# Patient Record
Sex: MALE | Race: WHITE | Employment: FULL TIME | URBAN - METROPOLITAN AREA
[De-identification: names, ages, dates, MRNs, and addresses within clinical notes are randomized per-mention and may not be internally consistent; named-entity substitution may affect disease eponyms.]

---

## 2017-04-11 ENCOUNTER — HOSPITAL ENCOUNTER (INPATIENT)
Age: 42
LOS: 7 days | Discharge: HOME | DRG: 057 | End: 2017-04-18
Attending: INTERNAL MEDICINE | Admitting: INTERNAL MEDICINE
Payer: COMMERCIAL

## 2017-04-11 DIAGNOSIS — E55.9 VITAMIN D INSUFFICIENCY: Chronic | ICD-10-CM

## 2017-04-11 DIAGNOSIS — I63.9 ACUTE ISCHEMIC STROKE (HCC): Primary | ICD-10-CM

## 2017-04-11 DIAGNOSIS — E11.69 DYSLIPIDEMIA WITH LOW HIGH DENSITY LIPOPROTEIN (HDL) CHOLESTEROL WITH HYPERTRIGLYCERIDEMIA DUE TO TYPE 2 DIABETES MELLITUS (HCC): Chronic | ICD-10-CM

## 2017-04-11 DIAGNOSIS — E78.2 DYSLIPIDEMIA WITH LOW HIGH DENSITY LIPOPROTEIN (HDL) CHOLESTEROL WITH HYPERTRIGLYCERIDEMIA DUE TO TYPE 2 DIABETES MELLITUS (HCC): Chronic | ICD-10-CM

## 2017-04-11 DIAGNOSIS — N18.2 TYPE 2 DIABETES MELLITUS WITH STAGE 2 CHRONIC KIDNEY DISEASE, WITHOUT LONG-TERM CURRENT USE OF INSULIN (HCC): ICD-10-CM

## 2017-04-11 DIAGNOSIS — E11.22 TYPE 2 DIABETES MELLITUS WITH STAGE 2 CHRONIC KIDNEY DISEASE, WITHOUT LONG-TERM CURRENT USE OF INSULIN (HCC): ICD-10-CM

## 2017-04-11 DIAGNOSIS — I11.9 HYPERTENSIVE HEART DISEASE WITHOUT HEART FAILURE: Chronic | ICD-10-CM

## 2017-04-11 PROBLEM — Z78.9 IMPAIRED MOBILITY AND ADLS: Status: ACTIVE | Noted: 2017-04-06

## 2017-04-11 PROBLEM — Z74.09 IMPAIRED MOBILITY AND ADLS: Status: ACTIVE | Noted: 2017-04-06

## 2017-04-11 PROBLEM — E78.5 DYSLIPIDEMIA: Chronic | Status: ACTIVE | Noted: 2017-04-06

## 2017-04-11 PROBLEM — E11.9 NEW ONSET TYPE 2 DIABETES MELLITUS (HCC): Chronic | Status: ACTIVE | Noted: 2017-04-06

## 2017-04-11 PROBLEM — I69.354 HEMIPARESIS AFFECTING LEFT SIDE AS LATE EFFECT OF CEREBROVASCULAR ACCIDENT (CVA) (HCC): Status: ACTIVE | Noted: 2017-04-06

## 2017-04-11 LAB — GLUCOSE BLD STRIP.AUTO-MCNC: 171 MG/DL (ref 70–110)

## 2017-04-11 PROCEDURE — 65310000000 HC RM PRIVATE REHAB

## 2017-04-11 PROCEDURE — 74011250636 HC RX REV CODE- 250/636: Performed by: INTERNAL MEDICINE

## 2017-04-11 PROCEDURE — 74011250637 HC RX REV CODE- 250/637: Performed by: INTERNAL MEDICINE

## 2017-04-11 PROCEDURE — 74011636637 HC RX REV CODE- 636/637: Performed by: INTERNAL MEDICINE

## 2017-04-11 PROCEDURE — 82962 GLUCOSE BLOOD TEST: CPT

## 2017-04-11 RX ORDER — LISINOPRIL 20 MG/1
20 TABLET ORAL DAILY
Status: DISCONTINUED | OUTPATIENT
Start: 2017-04-12 | End: 2017-04-12

## 2017-04-11 RX ORDER — BISACODYL 5 MG
10 TABLET, DELAYED RELEASE (ENTERIC COATED) ORAL
Status: DISCONTINUED | OUTPATIENT
Start: 2017-04-11 | End: 2017-04-18 | Stop reason: HOSPADM

## 2017-04-11 RX ORDER — ACETAMINOPHEN 325 MG/1
650 TABLET ORAL
Status: DISCONTINUED | OUTPATIENT
Start: 2017-04-11 | End: 2017-04-18 | Stop reason: HOSPADM

## 2017-04-11 RX ORDER — LISINOPRIL AND HYDROCHLOROTHIAZIDE 20; 25 MG/1; MG/1
1 TABLET ORAL DAILY
COMMUNITY
End: 2017-04-18

## 2017-04-11 RX ORDER — AMLODIPINE BESYLATE 5 MG/1
10 TABLET ORAL DAILY
Status: DISCONTINUED | OUTPATIENT
Start: 2017-04-12 | End: 2017-04-18 | Stop reason: HOSPADM

## 2017-04-11 RX ORDER — INSULIN LISPRO 100 [IU]/ML
INJECTION, SOLUTION INTRAVENOUS; SUBCUTANEOUS
Status: DISCONTINUED | OUTPATIENT
Start: 2017-04-12 | End: 2017-04-18 | Stop reason: HOSPADM

## 2017-04-11 RX ORDER — ATORVASTATIN CALCIUM 40 MG/1
40 TABLET, FILM COATED ORAL
Status: DISCONTINUED | OUTPATIENT
Start: 2017-04-11 | End: 2017-04-18 | Stop reason: HOSPADM

## 2017-04-11 RX ORDER — HEPARIN SODIUM 5000 [USP'U]/ML
5000 INJECTION, SOLUTION INTRAVENOUS; SUBCUTANEOUS EVERY 8 HOURS
Status: DISCONTINUED | OUTPATIENT
Start: 2017-04-11 | End: 2017-04-18 | Stop reason: HOSPADM

## 2017-04-11 RX ORDER — AMLODIPINE BESYLATE 5 MG/1
5 TABLET ORAL DAILY
Status: ON HOLD | COMMUNITY
End: 2017-04-17

## 2017-04-11 RX ORDER — ASPIRIN 325 MG
325 TABLET ORAL
Status: DISCONTINUED | OUTPATIENT
Start: 2017-04-12 | End: 2017-04-18 | Stop reason: HOSPADM

## 2017-04-11 RX ORDER — DOCUSATE SODIUM 100 MG/1
100 CAPSULE, LIQUID FILLED ORAL 2 TIMES DAILY
Status: DISCONTINUED | OUTPATIENT
Start: 2017-04-11 | End: 2017-04-12

## 2017-04-11 RX ORDER — METFORMIN HYDROCHLORIDE 500 MG/1
500 TABLET ORAL 2 TIMES DAILY WITH MEALS
Status: DISCONTINUED | OUTPATIENT
Start: 2017-04-12 | End: 2017-04-18 | Stop reason: HOSPADM

## 2017-04-11 RX ADMIN — HEPARIN SODIUM 5000 UNITS: 5000 INJECTION, SOLUTION INTRAVENOUS; SUBCUTANEOUS at 21:30

## 2017-04-11 RX ADMIN — INSULIN DETEMIR 10 UNITS: 100 INJECTION, SOLUTION SUBCUTANEOUS at 22:44

## 2017-04-11 RX ADMIN — DOCUSATE SODIUM 100 MG: 100 CAPSULE, LIQUID FILLED ORAL at 21:30

## 2017-04-11 RX ADMIN — ATORVASTATIN CALCIUM 40 MG: 40 TABLET, FILM COATED ORAL at 21:30

## 2017-04-11 NOTE — IP AVS SNAPSHOT
Summary of Care Report The Summary of Care report has been created to help improve care coordination. Users with access to hereO or [facility] (Web-based application) may access additional patient information including the Discharge Summary. If you are not currently a [address] user and need more information, please call the number listed below in the [place] [address] section and ask to be connected with Medical Records. Facility Information Name Address Phone [address] [place] [address]  [address] [facility] [address] [phone] Patient Information Patient Name Sex AICHA Sharp ([id number]) Male 1975 Referral Details Referred By Referred To  
 Mukesh Erwin MD  
[address] [address] Phone:  [phone] Fax:  [phone] Order:  Referral To Physical Therapy Reason:  Specialty Services Required Comment:  Evaluate and treat (three times a week for 4 weeks). Diagnosis:   
 Acute Ischemic Stroke (acute right basal ganglia region infarct) with residual left hemiparesis and mild cognitive-linguistic deficits * After 4 weeks of physical therapy, kindly send recertification request to PCP (Dr. Soha Bella. Leda Burkett) Mukesh Volly, [address] [address] Phone:  [phone] Fax:  [phone] Order:  Referral To Occupational Therapy Reason:  Specialty Services Required Comment:  Evaluate and treat (three times a week for 4 weeks). Diagnosis:   
 Acute Ischemic Stroke (acute right basal ganglia region infarct) with residual left hemiparesis and mild cognitive-linguistic deficits * After 4 weeks of occupational therapy, kindly send recertification request to PCP (Dr. Soha Bella. Leda Burkett) Mukesh [address] [address] Phone:  [phone] Fax:  [phone] Order:  Referral To Speech Therapy Reason:  Specialty Services Required Comment:  Evaluate and treat (three times a week for 4 weeks). Diagnosis:   
 Acute Ischemic Stroke (acute right basal ganglia region infarct) with residual left hemiparesis and mild cognitive-linguistic deficits * After 4 weeks of speech therapy, kindly send recertification request to PCP (Dr. Jhonatan Heaton. Edwige Voss) Discharge Information Admitting Provider Service Area Unit Pepito Kendall MD / [address] Unit / [phone] Discharge Provider Discharge Date/Time Discharge Disposition Destination (none) 4/18/2017 Morning (Pending) AHR (none) Patient Language Language ENGLISH [13] Hospital Problems as of 4/18/2017  Reviewed: 4/18/2017  9:52 AM by Pepito Kendall MD  
  
  
  
 Class Noted - Resolved Last Modified POA Active Problems Hypertensive heart disease without heart failure (Chronic)  Unknown - Present 4/11/2017 by Pepito Kendall MD Yes Entered by Pepito Kendall MD  
  * (Principal)Acute ischemic stroke [facility])  4/6/2017 - Present 4/12/2017 by Pepito Kendall MD Yes Entered by Pepito Kendall MD  
  Overview Addendum 4/12/2017  5:37 PM by Pepito Kendall MD  
   Acute Ischemic Stroke (acute right basal ganglia region infarct) with residual left hemiparesis and mild cognitive-linguistic deficits Impaired mobility and ADLs  4/6/2017 - Present 4/11/2017 by Pepito Kendall MD Yes Entered by Pepito Kendall MD  
  Hemiparesis affecting left side as late effect of cerebrovascular accident (CVA) ([facility] [address]  4/6/2017 - Present 4/11/2017 by Pepito Kendall MD Yes   Entered by Pepito Kendall MD  
  Dyslipidemia with low high density lipoprotein (HDL) cholesterol with hypertriglyceridemia due to type 2 diabetes mellitus (HCC) (Chronic) 4/6/2017 - Present 4/12/2017 by Percy Alexander MD Yes Entered by Percy Alexander MD  
  Overview Signed 4/12/2017 10:10 AM by Percy Alexander MD  
   Lipid profile (4/12/2017): , , HDL 29, LDL 55 Vitamin D insufficiency (Chronic)  4/12/2017 - Present 4/12/2017 by Percy Alexander MD Yes Entered by Percy Alexander MD  
  Overview Signed 4/12/2017 10:03 AM by Percy Alexander MD  
   Vitamin D 25-Hydroxy (4/12/2017) = 22.6 Chronic kidney disease (CKD) stage G2/A1, mildly decreased glomerular filtration rate (GFR) between 60-89 mL/min/1.73 square meter and albuminuria creatinine ratio less than 30 mg/g (Chronic)  4/12/2017 - Present 4/13/2017 by Percy Alexander MD Yes Entered by Percy Alexander MD  
  Type 2 diabetes mellitus with stage 2 chronic kidney disease ([facility] [address]  4/6/2017 - Present 4/13/2017 by Percy Alexander MD Yes Entered by Percy Alexander MD  
  Overview Signed 4/13/2017  1:51 PM by Percy Alexander MD  
   HbA1c (4/12/2017) = 10.5 Non-Hospital Problems as of 4/18/2017  Reviewed: 4/18/2017  9:52 AM by Percy Alexander MD  
  
  
  
 Class Noted - Resolved Last Modified Active Problems Diastolic dysfunction without heart failure (Chronic)  Unknown - Present 4/11/2017 by Percy Alexander MD  
  Entered by Percy Alexander MD  
  Obesity, Class I, BMI 30-34.9 (Chronic)  Unknown - Present 4/11/2017 by Percy Alexander MD  
  Entered by Percy Alexander MD  
  
You are allergic to the following No active allergies Current Discharge Medication List  
  
START taking these medications Dose & Instructions Dispensing Information Comments  
 aspirin 325 mg tablet Commonly known as:  ASPIRIN Dose:  325 mg Take 1 Tab by mouth daily (with breakfast). Indications: acute thromboembolic stroke Quantity:  30 Tab Refills:  0  
   
 atorvastatin 40 mg tablet Commonly known as:  LIPITOR  Dose:  40 mg  
 Take 1 Tab by mouth nightly. Indications: DYSLIPIDEMIA Quantity:  30 Tab Refills:  0  
   
 cholecalciferol 1,000 unit tablet Commonly known as:  VITAMIN D3 Dose:  2000 Units Take 2 Tabs by mouth daily. Indications: VITAMIN D INSUFFICIENCY Quantity:  60 Tab Refills:  0  
   
 glipiZIDE 5 mg tablet Commonly known as:  Corean Shingles Dose:  5 mg Take 1 Tab by mouth Daily (before breakfast). Indications: type 2 diabetes mellitus Quantity:  30 Tab Refills:  0  
   
 labetalol 100 mg tablet Commonly known as:  Arie Galley Dose:  100 mg Take 1 Tab by mouth every twelve (12) hours. Indications: hypertension Quantity:  60 Tab Refills:  0  
   
 lisinopril 40 mg tablet Commonly known as:  Anand Bold Dose:  40 mg Take 1 Tab by mouth daily. Indications: hypertension Quantity:  30 Tab Refills:  0  
   
 metFORMIN 500 mg tablet Commonly known as:  GLUCOPHAGE Dose:  500 mg Take 1 Tab by mouth two (2) times daily (with meals). Indications: type 2 diabetes mellitus Quantity:  60 Tab Refills:  0  
   
 omega-3 acid ethyl esters 1 gram capsule Commonly known as:  Orie Senate Dose:  1 g Take 1 Cap by mouth two (2) times daily (with meals). Indications: HYPERTRIGLYCERIDEMIA Quantity:  60 Cap Refills:  0 CONTINUE these medications which have CHANGED Dose & Instructions Dispensing Information Comments  
 amLODIPine 10 mg tablet Commonly known as:  Masha Credit What changed:   
- medication strength 
- how much to take Dose:  10 mg Take 1 Tab by mouth daily. Indications: hypertension Quantity:  30 Tab Refills:  0 STOP taking these medications Comments  
 lisinopril-hydroCHLOROthiazide 20-25 mg per tablet Commonly known as:  America Price Follow-up Information Follow up With Details Comments Contact Info  Inocencia Zarate MD On 5/1/2017 Patient has an appointment scheduled with PCP Dr. Portia Pearson on May 1, 2017 @ 9:45am. [place] 
[address] [facility] 
[phone] Hayley Varner MD On 6/9/2017 Patient has an appointment scheduled with Neurology Dr. Bhargavi Carlton on June 9, 2017 @ 11:00am. [address] Burden [address] [facility] 
[phone] Discharge Instructions DISCHARGE SUMMARY from Nurse The following personal items are in your possession at time of discharge: 
 
Dental Appliances: None Visual Aid: At bedside, Glasses Home Medications: None Jewelry: None Clothing: None Other Valuables: Cell Phone Personal Items Sent to Safe: none PATIENT INSTRUCTIONS: 
 
 
F-face looks uneven A-arms unable to move or move unevenly S-speech slurred or non-existent T-time-call 911 as soon as signs and symptoms begin-DO NOT go Back to bed or wait to see if you get better-TIME IS BRAIN. Warning Signs of HEART ATTACK Call 911 if you have these symptoms: 
? Chest discomfort. Most heart attacks involve discomfort in the center of the chest that lasts more than a few minutes, or that goes away and comes back. It can feel like uncomfortable pressure, squeezing, fullness, or pain. ? Discomfort in other areas of the upper body. Symptoms can include pain or discomfort in one or both arms, the back, neck, jaw, or stomach. ? Shortness of breath with or without chest discomfort. ? Other signs may include breaking out in a cold sweat, nausea, or lightheadedness. Don't wait more than five minutes to call [address]! Fast action can save your life.  Calling 911 is almost always the fastest way to get lifesaving treatment. Emergency Medical Services staff can begin treatment when they arrive  up to an hour sooner than if someone gets to the hospital by car. The discharge information has been reviewed with the patient. The patient verbalized understanding. Discharge medications reviewed with the patient and appropriate educational materials and side effects teaching were provided. Patient armband removed and shredded MyChart Activation Thank you for requesting access to [employer]. Please follow the instructions below to securely access and download your online medical record. [employer] allows you to send messages to your doctor, view your test results, renew your prescriptions, schedule appointments, and more. How Do I Sign Up? 1. In your internet browser, go to [email or web address] 
2. Click on the First Time User? Click Here link in the Sign In box. You will be redirect to the New Member Sign Up page. 3. Enter your [employer] Access Code exactly as it appears below. You will not need to use this code after youve completed the sign-up process. If you do not sign up before the expiration date, you must request a new code. [employer] Access Code: [email or web address] Expires: 7/10/2017  8:26 PM (This is the date your [employer] access code will ) 4. Enter the last four digits of your Social Security Number (xxxx) and Date of Birth (mm/dd/yyyy) as indicated and click Submit. You will be taken to the next sign-up page. 5. Create a [employer] ID. This will be your [employer] login ID and cannot be changed, so think of one that is secure and easy to remember. 6. Create a [employer] password. You can change your password at any time. 7. Enter your Password Reset Question and Answer. This can be used at a later time if you forget your password. 8. Enter your e-mail address. You will receive e-mail notification when new information is available in [address] 9. Click Sign Up. You can now view and download portions of your medical record. 10. Click the Download Summary menu link to download a portable copy of your medical information. Additional Information If you have questions, please visit the Frequently Asked Questions section of the [employer] website at [email or web address] Remember, MyChart is NOT to be used for urgent needs. For medical emergencies, dial 911. 
 
 
 
 
 
 
 
 
------------------------------------------------------------------------------------------------------------ 
 
DISCHARGE INSTRUCTIONS 1. Make sure that when you request refills at the pharmacy that the refill requests are sent to your PCP (NOT to the prescriber at the [facility]) to avoid any delays in getting your medication refills. The physician at the [facility] for [address] [facility] will not able to order medications or refills after discharge -- Please understand that though we would like to help, it is simply not safe for our physician to order you a medication that we cannot monitor. 2. If any of the prescribed medications require a prior authorization, contact your Primary Care Physician or specialist to EITHER complete prior authorizations and paperwork on your behalf OR prescribe an alternative medication. -- Please understand that though we would like to help, it is simply not safe for our physician to order you a medication that we cannot monitor. 3. Over-the-counter (OTC) medications will NOT be prescribed. You need to buy these medications over-the-counter. 4. If you will be having outpatient therapy, make sure you have the paper prescription for your outpatient therapy when you leave the hospital. Also make sure that you bring this paper prescription to the outpatient gym you are going to get your outpatient therapy. ------------------------------------------------------------------------------------------------------------------- Chart Review Routing History No Routing History on File

## 2017-04-11 NOTE — IP AVS SNAPSHOT
Current Discharge Medication List  
  
START taking these medications Dose & Instructions Dispensing Information Comments Morning Noon Evening Bedtime  
 aspirin 325 mg tablet Commonly known as:  ASPIRIN Your last dose was: Your next dose is:    
   
   
 Dose:  325 mg Take 1 Tab by mouth daily (with breakfast). Indications: acute thromboembolic stroke Quantity:  30 Tab Refills:  0  
     
   
   
   
  
 atorvastatin 40 mg tablet Commonly known as:  LIPITOR Your last dose was: Your next dose is:    
   
   
 Dose:  40 mg Take 1 Tab by mouth nightly. Indications: DYSLIPIDEMIA Quantity:  30 Tab Refills:  0  
     
   
   
   
  
 cholecalciferol 1,000 unit tablet Commonly known as:  VITAMIN D3 Your last dose was: Your next dose is:    
   
   
 Dose:  2000 Units Take 2 Tabs by mouth daily. Indications: VITAMIN D INSUFFICIENCY Quantity:  60 Tab Refills:  0  
     
   
   
   
  
 glipiZIDE 5 mg tablet Commonly known as:  Artist Doll Your last dose was: Your next dose is:    
   
   
 Dose:  5 mg Take 1 Tab by mouth Daily (before breakfast). Indications: type 2 diabetes mellitus Quantity:  30 Tab Refills:  0  
     
   
   
   
  
 labetalol 100 mg tablet Commonly known as:  Debroah Lente Your last dose was: Your next dose is:    
   
   
 Dose:  100 mg Take 1 Tab by mouth every twelve (12) hours. Indications: hypertension Quantity:  60 Tab Refills:  0  
     
   
   
   
  
 lisinopril 40 mg tablet Commonly known as:  Walker Leonidas Your last dose was: Your next dose is:    
   
   
 Dose:  40 mg Take 1 Tab by mouth daily. Indications: hypertension Quantity:  30 Tab Refills:  0  
     
   
   
   
  
 metFORMIN 500 mg tablet Commonly known as:  GLUCOPHAGE Your last dose was:     
   
Your next dose is:    
   
   
 Dose:  500 mg  
 Take 1 Tab by mouth two (2) times daily (with meals). Indications: type 2 diabetes mellitus Quantity:  60 Tab Refills:  0  
     
   
   
   
  
 omega-3 acid ethyl esters 1 gram capsule Commonly known as:  Yvette Mahoney Your last dose was: Your next dose is:    
   
   
 Dose:  1 g Take 1 Cap by mouth two (2) times daily (with meals). Indications: HYPERTRIGLYCERIDEMIA Quantity:  60 Cap Refills:  0 CONTINUE these medications which have CHANGED Dose & Instructions Dispensing Information Comments Morning Noon Evening Bedtime  
 amLODIPine 10 mg tablet Commonly known as:  Paris Heath What changed:   
- medication strength 
- how much to take Your last dose was: Your next dose is:    
   
   
 Dose:  10 mg Take 1 Tab by mouth daily. Indications: hypertension Quantity:  30 Tab Refills:  0 STOP taking these medications   
 lisinopril-hydroCHLOROthiazide 20-25 mg per tablet Commonly known as:  Unruly Sheppard Where to Get Your Medications These medications were sent to [facility], [address] [place] [address], [address] [place] [id number] Phone:  [phone]  
  amLODIPine 10 mg tablet  
 aspirin 325 mg tablet  
 atorvastatin 40 mg tablet  
 cholecalciferol 1,000 unit tablet  
 glipiZIDE 5 mg tablet  
 labetalol 100 mg tablet  
 lisinopril 40 mg tablet  
 metFORMIN 500 mg tablet  
 omega-3 acid ethyl esters 1 gram capsule

## 2017-04-11 NOTE — IP AVS SNAPSHOT
[address] Patient: Shani Adair MRN: [id number] You are allergic to the following No active allergies Recent Documentation Height Weight BMI  
  
  
 1.676 m 91.1 kg 32.42 kg/m2 Emergency Contacts Name Discharge Info Relation Home Work Mobile Jaiden Ashton DECLINED CAREGIVER [4] Mother [14] [phone] Delmis Ackerman DECLINED CAREGIVER [4] Friend [5] [phone] About your hospitalization You were admitted on:  April 11, 2017 You last received care in the:  [facility] IP REHAB UNIT You were discharged on:  April 18, 2017 Unit phone number:  [phone] Why you were hospitalized Your primary diagnosis was:  Acute Ischemic Stroke (Hcc) Your diagnoses also included:  Hypertensive Heart Disease Without Heart Failure, Impaired Mobility And Adls, Hemiparesis Affecting Left Side As Late Effect Of Cerebrovascular Accident (Cva) (Hcc), Vitamin D Insufficiency, Dyslipidemia With Low High Density Lipoprotein (Hdl) Cholesterol With Hypertriglyceridemia Due To Type 2 Diabetes Mellitus (Hcc), Chronic Kidney Disease (Ckd) Stage G2/A1, Mildly Decreased Glomerular Filtration Rate (Gfr) Between 60-89 Ml/Min/1.73 Square Meter And Albuminuria Creatinine Ratio Less Than 30 Mg/G, Type 2 Diabetes Mellitus With Stage 2 Chronic Kidney Disease (Hcc) Providers Seen During Your Hospitalizations Provider Role Specialty Primary office phone Lior Boateng MD Attending Provider Internal Medicine [phone] Your Primary Care Physician (PCP) Primary Care Physician Office Phone Office Fax Ole Hester [address], [address] [phone] Follow-up Information Follow up With Details Comments Contact Info  Emmy Early MD On 5/1/2017 Patient has an appointment scheduled with PCP Dr. Joanne Cullen on May 1, 2017 @ 9:45am. [place] 
[address] 200 [facility] 
[phone] Jessenia Baldwin MD On 6/9/2017 Patient has an appointment scheduled with Neurology Dr. Alejandro Summers on June 9, 2017 @ 11:00am. [address] 200 [facility] 
[phone] Current Discharge Medication List  
  
START taking these medications Dose & Instructions Dispensing Information Comments Morning Noon Evening Bedtime  
 aspirin 325 mg tablet Commonly known as:  ASPIRIN Your last dose was: Your next dose is:    
   
   
 Dose:  325 mg Take 1 Tab by mouth daily (with breakfast). Indications: acute thromboembolic stroke Quantity:  30 Tab Refills:  0  
     
   
   
   
  
 atorvastatin 40 mg tablet Commonly known as:  LIPITOR Your last dose was: Your next dose is:    
   
   
 Dose:  40 mg Take 1 Tab by mouth nightly. Indications: DYSLIPIDEMIA Quantity:  30 Tab Refills:  0  
     
   
   
   
  
 cholecalciferol 1,000 unit tablet Commonly known as:  VITAMIN D3 Your last dose was: Your next dose is:    
   
   
 Dose:  2000 Units Take 2 Tabs by mouth daily. Indications: VITAMIN D INSUFFICIENCY Quantity:  60 Tab Refills:  0  
     
   
   
   
  
 glipiZIDE 5 mg tablet Commonly known as:  Bereket Sauger Your last dose was: Your next dose is:    
   
   
 Dose:  5 mg Take 1 Tab by mouth Daily (before breakfast). Indications: type 2 diabetes mellitus Quantity:  30 Tab Refills:  0  
     
   
   
   
  
 labetalol 100 mg tablet Commonly known as:  Lubertha Malloy Your last dose was: Your next dose is:    
   
   
 Dose:  100 mg Take 1 Tab by mouth every twelve (12) hours. Indications: hypertension Quantity:  60 Tab Refills:  0  
     
   
   
   
  
 lisinopril 40 mg tablet Commonly known as:  Masha Cozier Your last dose was: Your next dose is:    
   
   
 Dose:  40 mg Take 1 Tab by mouth daily. Indications: hypertension Quantity:  30 Tab Refills:  0  
     
   
   
   
  
 metFORMIN 500 mg tablet Commonly known as:  GLUCOPHAGE Your last dose was: Your next dose is:    
   
   
 Dose:  500 mg Take 1 Tab by mouth two (2) times daily (with meals). Indications: type 2 diabetes mellitus Quantity:  60 Tab Refills:  0  
     
   
   
   
  
 omega-3 acid ethyl esters 1 gram capsule Commonly known as:  Mary Bina Your last dose was: Your next dose is:    
   
   
 Dose:  1 g Take 1 Cap by mouth two (2) times daily (with meals). Indications: HYPERTRIGLYCERIDEMIA Quantity:  60 Cap Refills:  0 CONTINUE these medications which have CHANGED Dose & Instructions Dispensing Information Comments Morning Noon Evening Bedtime  
 amLODIPine 10 mg tablet Commonly known as:  Ji Sosa What changed:   
- medication strength 
- how much to take Your last dose was: Your next dose is:    
   
   
 Dose:  10 mg Take 1 Tab by mouth daily. Indications: hypertension Quantity:  30 Tab Refills:  0 STOP taking these medications   
 lisinopril-hydroCHLOROthiazide 20-25 mg per tablet Commonly known as:  Saulo Nevarez Where to Get Your Medications These medications were sent to [facility], [address] [place] [address], [address] [place] [address] Phone:  [phone]  
  amLODIPine 10 mg tablet  
 aspirin 325 mg tablet  
 atorvastatin 40 mg tablet  
 cholecalciferol 1,000 unit tablet  
 glipiZIDE 5 mg tablet  
 labetalol 100 mg tablet  
 lisinopril 40 mg tablet  
 metFORMIN 500 mg tablet  
 omega-3 acid ethyl esters 1 gram capsule Discharge Instructions DISCHARGE SUMMARY from Nurse The following personal items are in your possession at time of discharge: Dental Appliances: None Visual Aid: At bedside, Glasses Home Medications: None Jewelry: None Clothing: None Other Valuables: Cell Phone Personal Items Sent to Safe: none PATIENT INSTRUCTIONS: 
 
 
F-face looks uneven A-arms unable to move or move unevenly S-speech slurred or non-existent T-time-call 911 as soon as signs and symptoms begin-DO NOT go Back to bed or wait to see if you get better-TIME IS BRAIN. Warning Signs of HEART ATTACK Call 911 if you have these symptoms: 
? Chest discomfort. Most heart attacks involve discomfort in the center of the chest that lasts more than a few minutes, or that goes away and comes back. It can feel like uncomfortable pressure, squeezing, fullness, or pain. ? Discomfort in other areas of the upper body. Symptoms can include pain or discomfort in one or both arms, the back, neck, jaw, or stomach. ? Shortness of breath with or without chest discomfort. ? Other signs may include breaking out in a cold sweat, nausea, or lightheadedness. Don't wait more than five minutes to call [address]! Fast action can save your life. Calling 911 is almost always the fastest way to get lifesaving treatment. Emergency Medical Services staff can begin treatment when they arrive  up to an hour sooner than if someone gets to the hospital by car. The discharge information has been reviewed with the patient. The patient verbalized understanding. Discharge medications reviewed with the patient and appropriate educational materials and side effects teaching were provided. Patient armband removed and shredded MyChart Activation Thank you for requesting access to [employer]d. Please follow the instructions below to securely access and download your online medical record. [employer]d allows you to send messages to your doctor, view your test results, renew your prescriptions, schedule appointments, and more. How Do I Sign Up? 1. In your internet browser, go to [email or web address] 
2. Click on the First Time User? Click Here link in the Sign In box. You will be redirect to the New Member Sign Up page. 3. Enter your [employer]d Access Code exactly as it appears below. You will not need to use this code after youve completed the sign-up process. If you do not sign up before the expiration date, you must request a new code. [facility]C[employer]d Access Code: [email or web address] Expires: 7/10/2017  8:26 PM (This is the date your [employer]d access code will ) 4. Enter the last four digits of your Social Security Number (xxxx) and Date of Birth (mm/dd/yyyy) as indicated and click Submit. You will be taken to the next sign-up page. 5. Create a ieC[employer]d ID. This will be your [facility]C[employer]d login ID and cannot be changed, so think of one that is secure and easy to remember. 6. Create a [employer]d password. You can change your password at any time. 7. Enter your Password Reset Question and Answer. This can be used at a later time if you forget your password. 8. Enter your e-mail address. You will receive e-mail notification when new information is available in [address] 9. Click Sign Up. You can now view and download portions of your medical record. 10. Click the Download Summary menu link to download a portable copy of your medical information. Additional Information If you have questions, please visit the Frequently Asked Questions section of the [employer]Crowd website at [email or web address] Remember, ieCrowd is NOT to be used for urgent needs. For medical emergencies, dial 911. ------------------------------------------------------------------------------------------------------------ 
 
DISCHARGE INSTRUCTIONS 1. Make sure that when you request refills at the pharmacy that the refill requests are sent to your PCP (NOT to the prescriber at the [facility]) to avoid any delays in getting your medication refills. The physician at the [facility] for [facility] [address] will not able to order medications or refills after discharge -- Please understand that though we would like to help, it is simply not safe for our physician to order you a medication that we cannot monitor. 2. If any of the prescribed medications require a prior authorization, contact your Primary Care Physician or specialist to EITHER complete prior authorizations and paperwork on your behalf OR prescribe an alternative medication. -- Please understand that though we would like to help, it is simply not safe for our physician to order you a medication that we cannot monitor. 3. Over-the-counter (OTC) medications will NOT be prescribed. You need to buy these medications over-the-counter. 4. If you will be having outpatient therapy, make sure you have the paper prescription for your outpatient therapy when you leave the hospital. Also make sure that you bring this paper prescription to the outpatient gym you are going to get your outpatient therapy. 
 
------------------------------------------------------------------------------------------------------------------- Discharge Orders Procedure Order Date Status Priority Quantity Spec Type Associated Dx REFERRAL TO PHYSICAL THERAPY 04/14/17 1330 Normal Routine 1 Comments:  Evaluate and treat (three times a week for 4 weeks). Diagnosis:  
Acute Ischemic Stroke (acute right basal ganglia region infarct) with residual left hemiparesis and mild cognitive-linguistic deficits * After 4 weeks of physical therapy, kindly send recertification request to PCP (Dr. Jinny Saul. Emy Serna) REFERRAL TO OCCUPATIONAL THERAPY 04/14/17 1330 Normal Routine 1 Comments:  Evaluate and treat (three times a week for 4 weeks). Diagnosis:  
Acute Ischemic Stroke (acute right basal ganglia region infarct) with residual left hemiparesis and mild cognitive-linguistic deficits * After 4 weeks of occupational therapy, kindly send recertification request to PCP (Dr. Jinny Saul. Emy Serna) REFERRAL TO SPEECH THERAPY 04/14/17 1330 Normal Routine 1 Comments:  Evaluate and treat (three times a week for 4 weeks). Diagnosis:  
Acute Ischemic Stroke (acute right basal ganglia region infarct) with residual left hemiparesis and mild cognitive-linguistic deficits * After 4 weeks of speech therapy, kindly send recertification request to PCP (Dr. Jinny Saul. Emy Serna) End[facility]ed Announcement We are excited to announce that we are making your provider's discharge notes available to you in Endymed. You will see these notes when they are completed and signed by the physician that discharged you from your recent hospital stay. If you have any questions or concerns about any information you see in Endymed, please call the Health Information Department where you were seen or reach out to your Primary Care Provider for more information about your plan of care. Introducing [facility]! Renetta Chapa introduces Endymed patient portal. Now you can access parts of your medical record, email your doctor's office, and request medication refills online. 1. In your internet browser, go to [email or web address] 2. Click on the First Time User? Click Here link in the Sign In box. You will see the New Member Sign Up page. 3. Enter your Endymed Access Code exactly as it appears below. You will not need to use this code after youve completed the sign-up process.  If you do not sign up before the expiration date, you must request a new code. · [employer] Access Code: [email or web address] Expires: 7/10/2017  8:26 PM 
 
4. Enter the last four digits of your Social Security Number (xxxx) and Date of Birth (mm/dd/yyyy) as indicated and click Submit. You will be taken to the next sign-up page. 5. Create a [employer] ID. This will be your [employer] login ID and cannot be changed, so think of one that is secure and easy to remember. 6. Create a [employer] password. You can change your password at any time. 7. Enter your Password Reset Question and Answer. This can be used at a later time if you forget your password. 8. Enter your e-mail address. You will receive e-mail notification when new information is available in [address] 9. Click Sign Up. You can now view and download portions of your medical record. 10. Click the Download Summary menu link to download a portable copy of your medical information. If you have questions, please visit the Frequently Asked Questions section of the [employer] website. Remember, [employer] is NOT to be used for urgent needs. For medical emergencies, dial 911. Now available from your iPhone and Android! General Information Please provide this summary of care documentation to your next provider. Patient Signature:  ____________________________________________________________ Date:  ____________________________________________________________  
  
[facility] Provider Signature:  ____________________________________________________________ Date:  ____________________________________________________________

## 2017-04-12 PROBLEM — E11.9 NEW ONSET TYPE 2 DIABETES MELLITUS (HCC): Status: ACTIVE | Noted: 2017-04-06

## 2017-04-12 PROBLEM — E78.2 DYSLIPIDEMIA WITH LOW HIGH DENSITY LIPOPROTEIN (HDL) CHOLESTEROL WITH HYPERTRIGLYCERIDEMIA DUE TO TYPE 2 DIABETES MELLITUS (HCC): Chronic | Status: ACTIVE | Noted: 2017-04-06

## 2017-04-12 PROBLEM — E11.69 DYSLIPIDEMIA WITH LOW HIGH DENSITY LIPOPROTEIN (HDL) CHOLESTEROL WITH HYPERTRIGLYCERIDEMIA DUE TO TYPE 2 DIABETES MELLITUS (HCC): Status: ACTIVE | Noted: 2017-04-06

## 2017-04-12 PROBLEM — E78.2 DYSLIPIDEMIA WITH LOW HIGH DENSITY LIPOPROTEIN (HDL) CHOLESTEROL WITH HYPERTRIGLYCERIDEMIA DUE TO TYPE 2 DIABETES MELLITUS (HCC): Status: ACTIVE | Noted: 2017-04-06

## 2017-04-12 PROBLEM — E11.69 DYSLIPIDEMIA WITH LOW HIGH DENSITY LIPOPROTEIN (HDL) CHOLESTEROL WITH HYPERTRIGLYCERIDEMIA DUE TO TYPE 2 DIABETES MELLITUS (HCC): Chronic | Status: ACTIVE | Noted: 2017-04-06

## 2017-04-12 PROBLEM — E55.9 VITAMIN D INSUFFICIENCY: Chronic | Status: ACTIVE | Noted: 2017-04-12

## 2017-04-12 PROBLEM — E78.1 HYPERTRIGLYCERIDEMIA: Chronic | Status: ACTIVE | Noted: 2017-04-11

## 2017-04-12 LAB
25(OH)D3 SERPL-MCNC: 22.6 NG/ML (ref 30–100)
ALBUMIN SERPL BCP-MCNC: 3.3 G/DL (ref 3.4–5)
ALBUMIN/GLOB SERPL: 0.9 {RATIO} (ref 0.8–1.7)
ALP SERPL-CCNC: 114 U/L (ref 45–117)
ALT SERPL-CCNC: 65 U/L (ref 16–61)
ANION GAP BLD CALC-SCNC: 8 MMOL/L (ref 3–18)
AST SERPL W P-5'-P-CCNC: 40 U/L (ref 15–37)
BASOPHILS # BLD AUTO: 0 K/UL (ref 0–0.1)
BASOPHILS # BLD: 0 % (ref 0–2)
BILIRUB DIRECT SERPL-MCNC: 0.2 MG/DL (ref 0–0.2)
BILIRUB SERPL-MCNC: 0.7 MG/DL (ref 0.2–1)
BUN SERPL-MCNC: 19 MG/DL (ref 7–18)
BUN/CREAT SERPL: 16 (ref 12–20)
CALCIUM SERPL-MCNC: 8.4 MG/DL (ref 8.5–10.1)
CHLORIDE SERPL-SCNC: 107 MMOL/L (ref 100–108)
CHOLEST SERPL-MCNC: 141 MG/DL
CO2 SERPL-SCNC: 25 MMOL/L (ref 21–32)
CREAT SERPL-MCNC: 1.18 MG/DL (ref 0.6–1.3)
CREAT UR-MCNC: 162 MG/DL (ref 30–125)
DIFFERENTIAL METHOD BLD: NORMAL
EOSINOPHIL # BLD: 0.2 K/UL (ref 0–0.4)
EOSINOPHIL NFR BLD: 2 % (ref 0–5)
ERYTHROCYTE [DISTWIDTH] IN BLOOD BY AUTOMATED COUNT: 13.4 % (ref 11.6–14.5)
GLOBULIN SER CALC-MCNC: 3.5 G/DL (ref 2–4)
GLUCOSE BLD STRIP.AUTO-MCNC: 155 MG/DL (ref 70–110)
GLUCOSE BLD STRIP.AUTO-MCNC: 202 MG/DL (ref 70–110)
GLUCOSE BLD STRIP.AUTO-MCNC: 220 MG/DL (ref 70–110)
GLUCOSE BLD STRIP.AUTO-MCNC: 232 MG/DL (ref 70–110)
GLUCOSE SERPL-MCNC: 174 MG/DL (ref 74–99)
HBA1C MFR BLD: 10.5 % (ref 4.2–5.6)
HCT VFR BLD AUTO: 41.2 % (ref 36–48)
HDLC SERPL-MCNC: 29 MG/DL (ref 40–60)
HDLC SERPL: 4.9 {RATIO} (ref 0–5)
HGB BLD-MCNC: 14.5 G/DL (ref 13–16)
LDLC SERPL CALC-MCNC: 55.2 MG/DL (ref 0–100)
LIPID PROFILE,FLP: ABNORMAL
LYMPHOCYTES # BLD AUTO: 25 % (ref 21–52)
LYMPHOCYTES # BLD: 1.7 K/UL (ref 0.9–3.6)
MAGNESIUM SERPL-MCNC: 2.2 MG/DL (ref 1.6–2.6)
MCH RBC QN AUTO: 29.9 PG (ref 24–34)
MCHC RBC AUTO-ENTMCNC: 35.2 G/DL (ref 31–37)
MCV RBC AUTO: 84.9 FL (ref 74–97)
MICROALBUMIN UR-MCNC: 1.57 MG/DL (ref 0–3)
MICROALBUMIN/CREAT UR-RTO: 10 MG/G (ref 0–30)
MONOCYTES # BLD: 0.7 K/UL (ref 0.05–1.2)
MONOCYTES NFR BLD AUTO: 10 % (ref 3–10)
NEUTS SEG # BLD: 4.3 K/UL (ref 1.8–8)
NEUTS SEG NFR BLD AUTO: 63 % (ref 40–73)
PLATELET # BLD AUTO: 257 K/UL (ref 135–420)
PMV BLD AUTO: 10.8 FL (ref 9.2–11.8)
POTASSIUM SERPL-SCNC: 3.8 MMOL/L (ref 3.5–5.5)
PROT SERPL-MCNC: 6.8 G/DL (ref 6.4–8.2)
RBC # BLD AUTO: 4.85 M/UL (ref 4.7–5.5)
SODIUM SERPL-SCNC: 140 MMOL/L (ref 136–145)
TRIGL SERPL-MCNC: 284 MG/DL (ref ?–150)
VLDLC SERPL CALC-MCNC: 56.8 MG/DL
WBC # BLD AUTO: 7 K/UL (ref 4.6–13.2)

## 2017-04-12 PROCEDURE — 74011250637 HC RX REV CODE- 250/637: Performed by: INTERNAL MEDICINE

## 2017-04-12 PROCEDURE — 80061 LIPID PANEL: CPT | Performed by: INTERNAL MEDICINE

## 2017-04-12 PROCEDURE — 80076 HEPATIC FUNCTION PANEL: CPT | Performed by: INTERNAL MEDICINE

## 2017-04-12 PROCEDURE — 97165 OT EVAL LOW COMPLEX 30 MIN: CPT

## 2017-04-12 PROCEDURE — 82306 VITAMIN D 25 HYDROXY: CPT | Performed by: INTERNAL MEDICINE

## 2017-04-12 PROCEDURE — 74011636637 HC RX REV CODE- 636/637: Performed by: INTERNAL MEDICINE

## 2017-04-12 PROCEDURE — 36415 COLL VENOUS BLD VENIPUNCTURE: CPT | Performed by: INTERNAL MEDICINE

## 2017-04-12 PROCEDURE — 92522 EVALUATE SPEECH PRODUCTION: CPT

## 2017-04-12 PROCEDURE — 82962 GLUCOSE BLOOD TEST: CPT

## 2017-04-12 PROCEDURE — 74011250636 HC RX REV CODE- 250/636: Performed by: INTERNAL MEDICINE

## 2017-04-12 PROCEDURE — 83090 ASSAY OF HOMOCYSTEINE: CPT | Performed by: INTERNAL MEDICINE

## 2017-04-12 PROCEDURE — 96125 COGNITIVE TEST BY HC PRO: CPT

## 2017-04-12 PROCEDURE — 85025 COMPLETE CBC W/AUTO DIFF WBC: CPT | Performed by: INTERNAL MEDICINE

## 2017-04-12 PROCEDURE — 97116 GAIT TRAINING THERAPY: CPT

## 2017-04-12 PROCEDURE — 97161 PT EVAL LOW COMPLEX 20 MIN: CPT

## 2017-04-12 PROCEDURE — 97530 THERAPEUTIC ACTIVITIES: CPT

## 2017-04-12 PROCEDURE — 83735 ASSAY OF MAGNESIUM: CPT | Performed by: INTERNAL MEDICINE

## 2017-04-12 PROCEDURE — 97535 SELF CARE MNGMENT TRAINING: CPT

## 2017-04-12 PROCEDURE — 82043 UR ALBUMIN QUANTITATIVE: CPT | Performed by: INTERNAL MEDICINE

## 2017-04-12 PROCEDURE — 83036 HEMOGLOBIN GLYCOSYLATED A1C: CPT | Performed by: INTERNAL MEDICINE

## 2017-04-12 PROCEDURE — 96105 ASSESSMENT OF APHASIA: CPT

## 2017-04-12 PROCEDURE — 65310000000 HC RM PRIVATE REHAB

## 2017-04-12 PROCEDURE — 80048 BASIC METABOLIC PNL TOTAL CA: CPT | Performed by: INTERNAL MEDICINE

## 2017-04-12 RX ORDER — MELATONIN
2000 DAILY
Status: DISCONTINUED | OUTPATIENT
Start: 2017-04-13 | End: 2017-04-18 | Stop reason: HOSPADM

## 2017-04-12 RX ORDER — OMEGA-3-ACID ETHYL ESTERS 1 G/1
1 CAPSULE, LIQUID FILLED ORAL 2 TIMES DAILY WITH MEALS
Status: DISCONTINUED | OUTPATIENT
Start: 2017-04-12 | End: 2017-04-18 | Stop reason: HOSPADM

## 2017-04-12 RX ORDER — LABETALOL 100 MG/1
100 TABLET, FILM COATED ORAL EVERY 12 HOURS
Status: DISCONTINUED | OUTPATIENT
Start: 2017-04-12 | End: 2017-04-18 | Stop reason: HOSPADM

## 2017-04-12 RX ORDER — LISINOPRIL 10 MG/1
10 TABLET ORAL ONCE
Status: COMPLETED | OUTPATIENT
Start: 2017-04-12 | End: 2017-04-12

## 2017-04-12 RX ORDER — HYDRALAZINE HYDROCHLORIDE 25 MG/1
25 TABLET, FILM COATED ORAL
Status: DISCONTINUED | OUTPATIENT
Start: 2017-04-12 | End: 2017-04-14

## 2017-04-12 RX ORDER — GLIPIZIDE 5 MG/1
5 TABLET ORAL
Status: DISCONTINUED | OUTPATIENT
Start: 2017-04-13 | End: 2017-04-18 | Stop reason: HOSPADM

## 2017-04-12 RX ORDER — LISINOPRIL 20 MG/1
40 TABLET ORAL DAILY
Status: DISCONTINUED | OUTPATIENT
Start: 2017-04-13 | End: 2017-04-18 | Stop reason: HOSPADM

## 2017-04-12 RX ADMIN — INSULIN LISPRO 4 UNITS: 100 INJECTION, SOLUTION INTRAVENOUS; SUBCUTANEOUS at 12:00

## 2017-04-12 RX ADMIN — HEPARIN SODIUM 5000 UNITS: 5000 INJECTION, SOLUTION INTRAVENOUS; SUBCUTANEOUS at 06:09

## 2017-04-12 RX ADMIN — METFORMIN HYDROCHLORIDE 500 MG: 500 TABLET, FILM COATED ORAL at 08:16

## 2017-04-12 RX ADMIN — LISINOPRIL 20 MG: 20 TABLET ORAL at 08:16

## 2017-04-12 RX ADMIN — AMLODIPINE BESYLATE 10 MG: 5 TABLET ORAL at 06:08

## 2017-04-12 RX ADMIN — HEPARIN SODIUM 5000 UNITS: 5000 INJECTION, SOLUTION INTRAVENOUS; SUBCUTANEOUS at 21:37

## 2017-04-12 RX ADMIN — LISINOPRIL 10 MG: 10 TABLET ORAL at 16:09

## 2017-04-12 RX ADMIN — ATORVASTATIN CALCIUM 40 MG: 40 TABLET, FILM COATED ORAL at 21:38

## 2017-04-12 RX ADMIN — Medication 50000 UNITS: at 16:08

## 2017-04-12 RX ADMIN — HEPARIN SODIUM 5000 UNITS: 5000 INJECTION, SOLUTION INTRAVENOUS; SUBCUTANEOUS at 14:03

## 2017-04-12 RX ADMIN — INSULIN LISPRO 4 UNITS: 100 INJECTION, SOLUTION INTRAVENOUS; SUBCUTANEOUS at 18:09

## 2017-04-12 RX ADMIN — INSULIN DETEMIR 15 UNITS: 100 INJECTION, SOLUTION SUBCUTANEOUS at 21:37

## 2017-04-12 RX ADMIN — ASPIRIN 325 MG ORAL TABLET 325 MG: 325 PILL ORAL at 08:16

## 2017-04-12 RX ADMIN — INSULIN LISPRO 2 UNITS: 100 INJECTION, SOLUTION INTRAVENOUS; SUBCUTANEOUS at 08:00

## 2017-04-12 RX ADMIN — OMEGA-3-ACID ETHYL ESTERS 1000 MG: 900 CAPSULE, LIQUID FILLED ORAL at 18:09

## 2017-04-12 RX ADMIN — METFORMIN HYDROCHLORIDE 500 MG: 500 TABLET, FILM COATED ORAL at 18:09

## 2017-04-12 NOTE — PROGRESS NOTES
Pt came in a wheelchair accompanied by his mother and friend. Pt in no distress. Pt denied any pain or discomfort. Continue to monitor. 2120:  Four eye skin assessment completed by Rachel Castillo RN and Naeem Vicente RN. Pt's skin is intact.

## 2017-04-12 NOTE — PROGRESS NOTES
Problem: Mobility Impaired (Adult and Pediatric)  Goal: *Acute Goals and Plan of Care (Insert Text)  PHYSICAL THERAPY EXAMINATION  Time in: 1135  Time Out: 1240  Patient Name: Francoise Sparrow  Patient Age: 36 y.o. Patient presents A/O x 4 sitting up in w/c reporting comfort and demonstrating good mentation. Sitting BP: 172/94 mmHg; nursing notified; Sitting BP+ 7 min: 162/102 mmHg. Patient participates in functional assessment as noted below as well as additional functional challenge including stand to kneel with B UE support at [facility] level with verbal cueing for form and sequencing. Picking up object from floor at [facility] level from standing position. Patient appropriate with safety education verbalizing understanding of call bell and indications for use. Patient reports motivation for therapy stating \"I need to get back to living alone at home. \" Patient offered time for questions regarding diagnosis/prognosis as it relate to physical recovery and mobility. Patient educated on valsalva prevention techniques and treatment techniques to improve recovery. Patient positioned sitting up following evaluation; nursing notified of patient status; call bell in reach. Patient also demonstrates decreased R LE kinesthesia presenting with uncontrolled lowering during stand<>kneel with R LE as support leg. Patient demonstrates WDL UE ROM though significant  strength deficits are present in the L hand as well as L opposition and fine motor control deficits remain present.       Past Medical History:   Past Medical History:   Diagnosis Date    Acute ischemic stroke ([facility] 4/6/2017     Acute Ischemic Stroke (acute right basal ganglia region infarct) with residual left hemiparesis    Diastolic dysfunction without heart failure      Dyslipidemia with low high density lipoprotein (HDL) cholesterol with hypertriglyceridemia due to type 2 diabetes mellitus ([address] 4/6/2017     Lipid profile (4/12/2017): , , HDL 29, LDL 55    Hemiparesis affecting left side as late effect of cerebrovascular accident (CVA) ([facility] 2017    New onset type 2 diabetes mellitus ([facility] 2017     HbA1c (2017) = 10.5    Obesity, Class I, BMI 30-34.9      Vitamin D insufficiency 2017     Vitamin D 25-Hydroxy (2017) = 22.6        Pain at start of tx:0/10  Pain at stop of tx:0/10     Patient identified with name and : YES     Medical Diagnosis:  CVA  Acute ischemic stroke ([facility] Acute ischemic stroke ([facility]     Therapy Diagnosis:   Difficulty with bed mobility  [X]     Difficulty with functional transfers  [X]     Difficulty with ambulation  [X]     Difficulty with stair negotiations  [X]        Problem List:    Decreased strength L LE  [X]     Decreased strength trunk/core  [ ]     Decreased AROM   [ ]     Decreased PROM  [ ]    Decreased endurance  [X]     Decreased balance sitting  [ ]     Decreased balance standing  [X]     Pain   [ ]     Slow ambulation velocity  [X]    Decreased coordination  [X]    Decreased safety awareness  [ ]       Functional Limitations:   Decreased independence with bed mobility  [X]     Decreased independence with functional transfers  [X]     Decreased independence with ambulation  [X]     Decreased independence with stair negotiation  [X]        Previous Functional Level: independent in home and community PTA.      Home Environment: Home Environment: Trailer/mobile home  # Steps to Enter: 4  One/Two Story Residence: One story  Living Alone: Yes  Support Systems: Friends \ neighbors, Parent  Patient Expects to be Discharged to[de-identified] Private residence  Current DME Used/Available at Home: None  Tub or Shower Type: Tub/Shower combination             Outcome Measures: FIM/MMT                 MMT Initial Assessment    Right Lower Extremity Left Lower Extremity   Hip Flexion 5 3+   Knee Extension 5 5   Knee Flexion 5 4+   Ankle Dorsiflexion 5 4-   0/5       No palpable muscle contraction  1/5       Palpable muscle contraction, no joint movement  2-/5      Less than full range of motion in gravity eliminated position  2/5       Able to complete full range of motion in gravity eliminated position  2+/5     Able to initiate movement against gravity  3-/5      More than half but not full range of motion against gravity  3/5       Able to complete full range of motion against gravity  3+/5     Completes full range of motion against gravity with minimal resistance  4-/5      Completes full range of motion against gravity with minimal-moderate resistance  4/5       Completes full range of motion against gravity with moderate resistance  4+/5     Completes full range of motion against gravity with moderate-maximum resistance  5/5       Completes full range of motion against gravity with maximum resistance                 AROM: WFL      FIM SCORES Initial Assessment   Bed/Chair/Wheelchair Transfers 5   Wheelchair Mobility 6   Walking [place] 5 (Supervision/setup)   Steps/Stairs 1   PRIMARY MODE OF LOCOMOTION: Amb w/o device  Please see IRC Interdisciplinary Eval: Coordination/Balance Section for details regarding FIM score description. BED/CHAIR/WHEELCHAIR TRANSFERS Initial Assessment   Rolling Right 5 (Supervision)   Rolling Left 5 (Supervision)   Supine to Sit 5 (Supervision)   Sit to Stand Supervision   Sit to Supine 5 (Supervision)   Transfer Assist Score Transfer Type: Other  Other: Patient performs stand step transfer with SPV requiring initiall cues for safe hand placement though he demosntrates appropriate management of transfer ability otherwise. Transfer Assistance : 5 (Supervision/setup)  Sit to Stand Assistance: Supervision  Car Transfers: Not tested  Car Type: NA   Transfer Type Other   Comments Patient performs stand step transfer with SPV requiring initiall cues for safe hand placement though he demosntrates appropriate management of transfer ability otherwise.    Car Transfer Not tested   Car Type NA WHEELCHAIR MOBILITY/MANAGEMENT Initial Assessment   Able to Propel 300 feet   Functional Level 6   Curbs/ramps assistance required 0 (Not tested)   Wheelchair set up assistance required 5 (Supervision/setup)   Wheelchair management Manages left brake, Manages right brake, Manages left footrest, Manages right footrest          WALKING INDEPENDENCE Initial Assessment   Assistive device Walker, rolling   Ambulation assistance - level surface 5 (Supervision/setup)   Distance 220 Feet (ft) (x 2 sets)   Functional Level 5   Comments Patient ambulates 220 ft with SPV requirning PT presence for safety. Ambulation assistance - unlevel surface NA          STEPS/STAIRS Initial Assessment   Steps/Stairs ambulated Steps/Stairs Ambulated (#): 3 (R HR non-reciprocal pattern SPV)  Level of Assist : 5 (Supervision/setup)  Rail Use: Right    Rail Use Right    Functional Level 1   Comments 3 steps w/ R HR; non-reciprocal pattern SPV level   Curbs/Ramps SBA              PHYSICAL THERAPY PLAN OF CARE     Therapy Diagnosis:   Please see table above     Order received from MD for physical therapy services and chart reviewed. Pt to be seen 5 times per week for 3 hours of total therapy per day for 8 days. Thank you for the referral.     Physical Therapy Goals  Initiated 4/12/2017 and to be accomplished within 8 day(s)  1. Patient will move from supine to sit and sit to supine , scoot up and down and roll side to side in bed with independence. 2.  Patient will transfer from bed to chair and chair to bed with independence using the least restrictive device. 3.  Patient will perform sit to stand with independence. 4.  Patient will ambulate with independence for 250 feet with the least restrictive device. 5.  Patient will ascend/descend 12 stairs with 1 handrail(s) with modified independence. Pt would benefit from skilled physical therapy in order to improve independent functional mobility within the home. Interventions may include range of motion (AROM, PROM B LE/trunk), motor function (B LE/trunk strengthening/coordination), activity tolerance (vitals, oxygen saturation levels), bed mobility training, balance activities, gait training (progressive ambulation program), and functional transfer training. Please see IRC; Interdisciplinary Eval, Care Plan, and Patient Education for further information regarding physical therapy examination and plan of care. Garfield Wilkinson, PT DPT  4/12/2017

## 2017-04-12 NOTE — REHAB NOTE
[facility]    Bedside and Verbal shift change report given to Sheyla Barry RN (oncoming nurse) by Popeye Jaramillo RN (offgoing nurse). Report included the following information SBAR, Kardex, MAR and Recent Results.     Situation:   Code Status: Full Code   Reason for Admission: CVA  Hospital Day: 1   Problem List:   Hospital Problems  Date Reviewed: 4/12/2017          Codes Class Noted POA    Vitamin D insufficiency (Chronic) ICD-10-CM: E55.9  ICD-9-CM: 268.9  4/12/2017 Yes    Overview Signed 4/12/2017 10:03 AM by Fabiola Burkett MD     Vitamin D 25-Hydroxy (4/12/2017) = 22.6             Hypertensive heart disease without heart failure (Chronic) ICD-10-CM: I11.9  ICD-9-CM: 402.90  Unknown Yes        * (Principal)Ac[facility] ([facility] ICD-10-CM: I63.9  ICD-9-CM: 434.91  4/6/2017 Yes    Overview Addendum 4/12/2017  5:37 PM by Fabiola Burkett MD     Acute Ischemic Stroke (acute right basal ganglia region infarct) with residual left hemiparesis and mild cognitive-linguistic deficits             Impaired mobility and ADLs ICD-10-CM: Z74.09  ICD-9-CM: 799.89  4/6/2017 Yes        Hemiparesis affecting left side as late effect of cerebrovascular accident (CVA) ([facility] ICD-10-CM: N55.665  ICD-9-CM: 438.20  4/6/2017 Yes        Dyslipidemia with low high density lipoprotein (HDL) cholesterol with hypertriglyceridemia due to type 2 diabetes mellitus (HCC) (Chronic) ICD-10-CM: E11.69, E78.6, E78.1  ICD-9-CM: 250.80, 272.4  4/6/2017 Yes    Overview Signed 4/12/2017 10:10 AM by Fabiola Burkett MD     Lipid profile (4/12/2017): , , HDL 29, LDL 55             New onset type 2 diabetes mellitus ([facility] ICD-10-CM: E11.9  ICD-9-CM: 250.00  4/6/2017 Yes    Overview Signed 4/12/2017  1:00 PM by Fabiola Burkett MD     HbA1c (4/12/2017) = 10.5                   Background:   Past Medical History:   Past Medical History:   Diagnosis Date    Acute ischemic stroke ([facility] 04/06/2017    Acute Ischemic Stroke (acute right basal ganglia region infarct) with residual left hemiparesis and mild cognitive-linguistic deficits    Diastolic dysfunction without heart failure     Dyslipidemia with low high density lipoprotein (HDL) cholesterol with hypertriglyceridemia due to type 2 diabetes mellitus ([facility] 4/6/2017    Lipid profile (4/12/2017): , , HDL 29, LDL 55    Hemiparesis affecting left side as late effect of cerebrovascular accident (CVA) ([facility] 4/6/2017    New onset type 2 diabetes mellitus ([facility] 04/06/2017    HbA1c (4/12/2017) = 10.5    Obesity, Class I, BMI 30-34.9     Vitamin D insufficiency 4/12/2017    Vitamin D 25-Hydroxy (4/12/2017) = 22.6      Patient taking anticoagulants  yes   Patient has a defibrillator: no     Assessment:   Changes in Assessment throughout shift: no     Patient has central line: no Reasons if yes: Insertion date: Last dressing date:   Patient has Pearce Cath: no Reasons if yes:     Insertion date:     Last Vitals:     Vitals:    04/12/17 0802 04/12/17 1140 04/12/17 1147 04/12/17 1634   BP: 148/89 (!) 172/94 (!) 162/102 (!) 150/99   Pulse: 88   (!) 106   Resp: 19   18   Temp: 98 °F (36.7 °C)   98.7 °F (37.1 °C)   SpO2: 97%   99%   Weight:       Height:            PAIN    Pain Assessment    Pain Intensity 1: 0 (04/12/17 1600) Pain Intensity 1: 2 (12/29/14 1105)      Pain Location 1: Abdomen      Pain Intervention(s) 1: Medication (see MAR)  Patient Stated Pain Goal: 0 Patient Stated Pain Goal: 0  o Intervention effective:  yes   o Other actions taken for pain: No complaints of pain     Skin Assessment  Skin color    Condition/Temperature    Integrity    Turgor    Weekly Pressure Ulcer Documentation Weekly Pressure Ulcer Documentation: No Pressure Ulcer Noted-Pressure Ulcer Prevention Initiated  Wound Prevention & Protection Methods  Orientation of wound Orientation of Wound Prevention: Posterior  Location of Prevention Location of Wound Prevention: Sacrum/Coccyx  Dressing Present Dressing Present : No  Dressing Status    Wound Offloading Wound Offloading (Prevention Methods): Bed, pressure redistribution/air     INTAKE/OUPUT    Date 04/11/17 1900 - 04/12/17 0659 04/12/17 0700 - 04/13/17 0659   Shift 1118-9044 24 Hour Total 2899-5104 7019-9172 24 Hour Total   I  N  T  A  K  E   P. O.   940  940      P. O.   940  940    Shift Total  (mL/kg)   940  (10.3)  940  (10.3)   O  U  T  P  U  T   Urine  (mL/kg/hr)           Urine Occurrence(s) 2 x 2 x 3 x  3 x    Stool           Stool Occurrence(s) 0 x 0 x 2 x  2 x    Shift Total  (mL/kg)        NET   940  940   Weight (kg) 91.1 91.1 91.1 91.1 91.1       Recommendations:  1. Patient needs and requests: none at this time    2. Diet: diabetic consistent regular    3. Pending tests/procedures: am labs     4. Functional Level/Equipment: assist x 1/whellchair    5. Estimated Discharge Date: tbd Posted on Whiteboard in Patients Room: no     [facility] Safety Check    A safety check occurred in the patient's room between off going nurse and oncoming nurse listed above. The safety check included the below items  Area Items   H  High Alert Medications - Verify all high alert medication drips (heparin, PCA, etc.)   E  Equipment - Suction is set up for ALL patients (with zainaker)  - Red plugs utilized for all equipment (IV pumps, etc.)  - WOWs wiped down at end of shift.  - Room stocked with oxygen, suction, and other unit-specific supplies   A  Alarms - Bed alarm is set for fall risk patients  - Ensure chair alarm is in place and activated if patient is up in a chair   L  Lines - Check IV for any infiltration  - Pearce bag is empty if patient has a Pearce   - Tubing and IV bags are labeled   S  Safety   - Room is clean, patient is clean, and equipment is clean. - Hallways are clear from equipment besides carts.    - Fall bracelet on for fall risk patients  - Ensure room is clear and free of clutter  - Suction is set up for ALL patients (with gonzalez)  - Hallways are clear from equipment besides carts.    - Isolation precautions followed, supplies available outside room, sign posted

## 2017-04-12 NOTE — PROGRESS NOTES
Pt is a 36year old male admitted to ARU for CVA. Pt is alert and oriented with his mother in the room. Pt consents to conversation with mother present. Pt states that he lives alone in a trailer with 4 steps to enter with bilateral rails. Pt states that he has a tub-shower. Pt denies history with [facility], home health, outpatient therapy or SNF. Pt states that this acute admission is first hospital admission since birth. Pt states that he is employed at [employer] and was able to self care prior to admission. Pt denies POA and states that his mother, Thania Perez [phone]) is his [facility] contact. Pt states that his girlfriend, Inell Sprain ([phone]) is an additional contact. Pt verified his insurance as [employer] from [place] and states understanding of auth process. Sw reviewed dc planning and team conference. Pt and mother deny having questions and state understanding. Sw will follow.

## 2017-04-12 NOTE — PROGRESS NOTES
Problem: Neurolinguistics Impaired (Adult)  Goal: *Speech Goal: (INSERT TEXT)  Long term goals:  1. Patient will recall short lists of up to 5 related items with 90% accuracy. 2. Patient will perform varied functional problem solving tasks (higher level) with 90% accuracy. 3. Patient will read paragraphs and respond to content questions with % accuracy. 4. Patient will discuss commonly heard figurative language (idioms) with 90% accuracy. Short term goals (by 4/19/17):  1. Patient will recall short lists of up to 5 related items with 75-85%% accuracy. 2. Patient will perform varied functional problem solving tasks (higher level) with 75-85% accuracy. 3. Patient will read paragraphs and respond to content questions with 80-90% accuracy. 4. Patient will discuss commonly heard figurative language (idioms) with 75-85% accuracy. SPEECH LANGUAGE PATHOLOGY EVALUATION     Patient: Anne Farias (76 y.o. male)  Date: 4/12/2017  Primary Diagnosis: CVA  Acute ischemic stroke [facility])        Precautions:   Fall      SUBJECTIVE:   Patient stated I don't think I am having trouble. OBJECTIVE:       Past Medical History:   Diagnosis Date    Acute ischemic stroke ([facility] 4/6/2017     Acute Ischemic Stroke (acute right basal ganglia region infarct) with residual left hemiparesis    Diastolic dysfunction without heart failure      Dyslipidemia with low high density lipoprotein (HDL) cholesterol with hypertriglyceridemia due to type 2 diabetes mellitus ([address] 4/6/2017     Lipid profile (4/12/2017): , , HDL 29, LDL 55    Hemiparesis affecting left side as late effect of cerebrovascular accident (CVA) ([address] 4/6/2017    New onset type 2 diabetes mellitus ([address] 04/06/2017     HbA1c (4/12/2017) = 10.5    Obesity, Class I, BMI 30-34.9      Vitamin D insufficiency 4/12/2017     Vitamin D 25-Hydroxy (4/12/2017) = 22.6   No past surgical history on file.   Prior Level of Function/Home Situation:   Home Situation  Home Environment: Trailer/mobile home  # Steps to Enter: 4  One/Two Story Residence: One story  Living Alone: Yes  Support Systems: Friends \ neighbors, Parent  Patient Expects to be Discharged to[de-identified] Private residence  Current DME Used/Available at Home: None  Mental Status:  Neurologic State: Alert  Orientation Level: Oriented X4  Cognition: Impaired decision making  Perception: Appears intact  Perseveration: No perseveration noted  Safety/Judgement: Awareness of environment  Motor Speech:  Oral-Motor Structure/Motor Speech  Face: Lower facial weakness  Labial: Other (comment) (minimal assymetry with movement)  Dentition: Natural  Oral Hygiene: WFL  Lingual: No impairment  Velum:  (Relatively large for size of pharynx)  Mandible: No impairment  Apraxic Characteristics: None  Dysarthric Characteristics: None  Intelligibility: No impairment  Intonation: WFL  Rate: WFL  Prosody: WFL  Overall Impairment Severity: Minimal  Language Comprehension and Expression:  Auditory Comprehension  Auditory Impairment: No   Cueing type: Verbal  Cueing amount: Minimal  Verbal Expression  Primary Mode of Expression: Verbal  Initiation: No impairment  Automatic Speech Task: No impairment  Repetition: Impaired  Repetition cueing type: Verbal  Repetition cueing amount: Minimal  Naming: No impairment  Sentence Completion: No impairment  Sentence Formulation: No impairment  Conversation: No impairment  Overall Impairment: None  Reading Comprehension  Visual Impairment: Glasses/contacts  Pre-Morbid Reading Status: Literate  Scanning/Tracking : No impairment  Words : Yes  Sentence: No Impairment  Paragraph : Impaired  Oral Reading: No impairment  Interfering Components: Attention to detail; Working memory  Effective Techniques: Large print;Prescription glasses/contact lenses  Overall Impairment Severity: Minimal  Written Expression  Pre-Morbid Dominant Hand: Right  Pre-Morbid Writing Skills: WFL  Legibility : Decreased legibility  Dictation : No impairment  Formulation: No impairment  Overall Impairment Severity: Minimal  Neuro-Linguistics:  Verbal Reasoning Tasks: Impaired  Verbal Problem Solving: Impaired  Verbal Organization: No Impairment  Thought Organization: WFL  Mathematical: No Impairment  Memory: Impaired (working memory)  Attention : No Impairement  Pragmatics:  Pragmatics Impairment: No impairment  Voice:  Patient's voice is WFL for his age and gender. Pain:  Pain Scale 1: Numeric (0 - 10)  Pain Intensity 1: 0     After treatment:   [X]              Patient left in no apparent distress sitting up in chair  [ ]              Patient left in no apparent distress in bed  [ ]              Call bell left within reach  [ ]              Nursing notified  [X]              Caregiver present  [ ]              Bed alarm activated      ASSESSMENT:   Based on the objective data described below, the patient presents with mild cognitive linguistic deficits secondary to recurrent lacunar infarcts. .     Patient will benefit from skilled intervention to address the above impairments. Patients rehabilitation potential is considered to be Good  Factors which may influence rehabilitation potential include:   [ ]              None noted  [ ]              Mental ability/status  [ ]              Medical condition  [X]              Home/family situation and support systems  [X]              Safety awareness  [ ]              Pain tolerance/management  [ ]              Other:       PLAN:   Recommendations and Planned Interventions:  SLP will follow daily to address cognition and reinforce strategies taught by OT and PT. Frequency/Duration: Patient will be followed by speech-language pathology 1-2 times per day/4-7 days per week to address goals. Discharge Recommendations: Outpatient      COMMUNICATION/EDUCATION:   [X]  Patient/family have participated as able in goal setting and plan of care.   [X]  Patient/family agree to work toward stated goals and plan of care. [ ]  Patient understands intent and goals of therapy, but is neutral about his/her participation. [ ]  Patient is unable to participate in goal setting and plan of care.      Thank you for this referral.  Kyle Blakely, SLP  Time Calcuation: 61 Minutes

## 2017-04-12 NOTE — REHAB NOTE
[facility]    Bedside and Verbal shift change report given to Jones Pena RN (oncoming nurse) by Brandy Buchanan RN (offgoing nurse). Report included the following information SBAR, Kardex, MAR and Recent Results. Situation:   Code Status: Full Code   Reason for Admission: CVA  Hospital Day: 1   Problem List:   Hospital Problems  Date Reviewed: 4/11/2017          Codes Class Noted POA    Hypertensive heart disease without heart failure (Chronic) ICD-10-CM: I11.9  ICD-9-CM: 402.90  Unknown Yes        * (Principal)Acute ischemic stroke ([facility] ICD-10-CM: I63.9  ICD-9-CM: 434.91  4/6/2017 Yes    Overview Signed 4/11/2017  8:15 PM by Bridget Fam MD     Acute Ischemic Stroke (acute right basal ganglia region infarct) with residual left hemiparesis             Impaired mobility and ADLs ICD-10-CM: Z74.09  ICD-9-CM: 799.89  4/6/2017 Yes        Hemiparesis affecting left side as late effect of cerebrovascular accident (CVA) ([address] ICD-10-CM: O51.591  ICD-9-CM: 438.20  4/6/2017 Yes        New onset type 2 diabetes mellitus ([address] (Chronic) ICD-10-CM: E11.9  ICD-9-CM: 250.00  4/6/2017 Yes              Background:   Past Medical History:   Past Medical History:   Diagnosis Date    Acute ischemic stroke ([address] 4/6/2017    Acute Ischemic Stroke (acute right basal ganglia region infarct) with residual left hemiparesis    Diastolic dysfunction without heart failure     Dyslipidemia 4/6/2017    Hemiparesis affecting left side as late effect of cerebrovascular accident (CVA) ([address] 4/6/2017    New onset type 2 diabetes mellitus ([address] 4/6/2017    Obesity, Class I, BMI 30-34.9       Patient taking anticoagulants  yes   Patient has a defibrillator: no     Assessment:   Changes in Assessment throughout shift: no     Patient has central line: no Reasons if yes: Insertion date: Last dressing date:   Patient has Pearce Cath: no Reasons if yes:     Insertion date:     Last Vitals:     Vitals:    04/11/17 2049   BP: (!) 155/96   Pulse: 97   Resp: 19   Temp: 97.8 °F (36.6 °C)   SpO2: 95%   Weight: 91.1 kg (200 lb 13.4 oz)   Height: 5' 6\" (1.676 m)        PAIN    Pain Assessment    Pain Intensity 1: 0 (04/12/17 0400) Pain Intensity 1: 2 (12/29/14 1105)      Pain Location 1: Abdomen      Pain Intervention(s) 1: Medication (see MAR)  Patient Stated Pain Goal: 0 Patient Stated Pain Goal: 0  o Intervention effective:  yes   o Other actions taken for pain: No complaints of pain     Skin Assessment  Skin color    Condition/Temperature    Integrity    Turgor    Weekly Pressure Ulcer Documentation Weekly Pressure Ulcer Documentation: No Pressure Ulcer Noted-Pressure Ulcer Prevention Initiated  Wound Prevention & Protection Methods  Orientation of wound Orientation of Wound Prevention: Posterior  Location of Prevention Location of Wound Prevention: Sacrum/Coccyx  Dressing Present Dressing Present : No  Dressing Status    Wound Offloading Wound Offloading (Prevention Methods): Bed, pressure redistribution/air     INTAKE/OUPUT    Date 04/11/17 0700 - 04/12/17 0659 04/12/17 0700 - 04/13/17 0659   Shift 9209-7733 0540-6066 24 Hour Total 1363-6309 5665-7451 24 Hour Total   I  N  T  A  K  E   Shift Total  (mL/kg)         O  U  T  P  U  T   Urine            Urine Occurrence(s)  1 x 1 x       Stool            Stool Occurrence(s)  0 x 0 x       Shift Total  (mL/kg)         NET         Weight (kg)  91.1 91.1 91.1 91.1 91.1       Recommendations:  1. Patient needs and requests: none at this time    2. Diet: diabetic consistent regular    3. Pending tests/procedures: am labs     4. Functional Level/Equipment: assist x 1/whellchair    5. Estimated Discharge Date: tbd Posted on Whiteboard in Patients Room: no     [facility] Safety Check    A safety check occurred in the patient's room between off going nurse and oncoming nurse listed above.     The safety check included the below items  Area Items   H  High Alert Medications - Verify all high alert medication drips (heparin, PCA, etc.)   E  Equipment - Suction is set up for ALL patients (with gonzalez)  - Red plugs utilized for all equipment (IV pumps, etc.)  - WOWs wiped down at end of shift.  - Room stocked with oxygen, suction, and other unit-specific supplies   A  Alarms - Bed alarm is set for fall risk patients  - Ensure chair alarm is in place and activated if patient is up in a chair   L  Lines - Check IV for any infiltration  - Pearce bag is empty if patient has a Pearce   - Tubing and IV bags are labeled   S  Safety   - Room is clean, patient is clean, and equipment is clean. - Hallways are clear from equipment besides carts. - Fall bracelet on for fall risk patients  - Ensure room is clear and free of clutter  - Suction is set up for ALL patients (with gonzalez)  - Hallways are clear from equipment besides carts.    - Isolation precautions followed, supplies available outside room, sign posted

## 2017-04-12 NOTE — H&P
[facility] PHYSICAL REHABILITATION  [address] [place] [address], [address]     INPATIENT REHABILITATION  HISTORY AND PHYSICAL  (Post Admission Physician Evaluation)    Name: Brayden Pagan CSN: [id number]   Age: 36 y.o. MRN: [id number]   Sex: male Admit Date: 4/11/2017     PCP: None      Primary Rehab Impairment Category (ARACELIS): Stroke    Impairment Group Label: Left body involvement (right brain)    Etiologic Diagnosis: Acute Ischemic Stroke (acute right basal ganglia region infarct) with residual left hemiparesis and mild cognitive-linguistic deficits      Subjective:     Patient seen and examined. History of the Present Illness: The patient is a 79-year-old, obese, White male with hypertension who was admitted to [facility] on 4/6/2017 due to left arm numbness and left facial droop. The patient was apparently well until the day of admission, while at work, the patient experienced left arm heaviness and left facial droop. He called his girlfriend who brought him to an urgent care center. They were told to go to the emergency room. The patient was brought to the [facility] Emergency Department for further evaluation. Speech was clear and thought process was clear. CT scan of the head showed no acute infarct, intracranial hemorrhage or mass effect; 9 mm left paramedian gulshan low-density, likely a chronic infarct; bilateral maxillary sinus disease. Patient was not deemed appropriate for intravenous tPA. The patient was admitted under the service of the [facility] Team (Dr. Nena Zafar). Unfractionated heparin was given for DVT prophylaxis. Neurology consult (Dr. Dixie Briseno) was called for evaluation and comanagement. Patient was started on Aspirin. Patient was diagnosed with Type 2 diabetes mellitus and he was started on Levemir insulin. Lipid profile showed elevated triglycerides and LDL. The patient was started on Atorvastatin.  MRI of the brain showed an acute/subacute right basal ganglia region infarct, 15 mm in diameter, involves right caudate body/tail, no hemorrhage or mass effect; mild to moderate burden of chronic lacunar disease in the brain stem and basal ganglia regions; bilateral maxillary sinus outflow obstruction. 2D echocardiogram showed LVEF 60%; mild left ventricular hypertrophy present; mild diastolic dysfunction; no masses, shunts or thrombi seen; negative bubble study x 2. Cardiology consult (Dr. Oriana Crowley) was called for evaluation and comanagement. CT angiogram of the head and neck showed no hemodynamically significant cervical vascular stenosis, suboptimal evaluation origin left vertebral artery and right common carotid artery due to artifact; moderate stenosis distal left intradural vertebral artery, mild stenosis proximal right intradural vertebral artery, mild to moderate stenosis distal left M1 and proximal M2 branches, mild stenosis bilateral posterior cerebral arteries. The patient had remained hemodynamically stable but due to the abovementioned neurologic deficit, the patient was noted to have impaired mobility and ADLs. Patient was felt to be a good candidate for acute inpatient rehabilitation. Upon evaluation by Physical Therapy and Occupational Therapy, the patient was recommended for acute inpatient rehabilitation. The patient was discharged and was subsequently admitted to the [facility] for intensive rehabilitation to help recover strength, function and mobility.       Past Medical History:  Past Medical History:   Diagnosis Date    Acute ischemic stroke ([facility] [address] 04/06/2017    Acute Ischemic Stroke (acute right basal ganglia region infarct) with residual left hemiparesis and mild cognitive-linguistic deficits    Diastolic dysfunction without heart failure     Dyslipidemia with low high density lipoprotein (HDL) cholesterol with hypertriglyceridemia due to type 2 diabetes mellitus ([facility] 4/6/2017    Lipid profile (4/12/2017): , , HDL 29, LDL 55    Hemiparesis affecting left side as late effect of cerebrovascular accident (CVA) ([facility] 4/6/2017    New onset type 2 diabetes mellitus ([facility] 04/06/2017    HbA1c (4/12/2017) = 10.5    Obesity, Class I, BMI 30-34.9     Vitamin D insufficiency 4/12/2017    Vitamin D 25-Hydroxy (4/12/2017) = 22.6       Past Surgical History:  No past surgical history on file. Allergies: Allergies no known allergies      Social History: The patient is single, lives alone in a 1-story mobile home/trailer with a 4-step entry and bilateral rails in [place], [place]. He denies any tobacco, alcohol or illicit drug use. He work's at [employer]. Family History: Both parents are alive. Family History   Problem Relation Age of Onset    Hypertension Mother     Diabetes Father        Home Medications (medications taken at home prior to admission to [facility]):    Prior to Admission Medications   Prescriptions Last Dose Informant Patient Reported? Taking? amLODIPine (NORVASC) 5 mg tablet   Yes Yes   Sig: Take 5 mg by mouth daily. Indications: hypertension   lisinopril-hydroCHLOROthiazide (PRINZIDE, ZESTORETIC) 20-25 mg per tablet   Yes Yes   Sig: Take 1 Tab by mouth daily. Indications: hypertension      Facility-Administered Medications: None       Transfer Medications (from the transfer summary prepared by Joyce Canales):  1. Levemir 18 units SC q HS  2. Novolog insulin sliding scale  3. Lisinopril-HCTZ 20-25 1 tab PO once daily  4. Amlodipine 5 mg PO once daily      Review Of Systems:   CONSTITUTIONAL: No weight loss. EYES: No blurred vision and no eye discharge. ENT: No nasal discharge. No ear pain. CARDIOVASCULAR: No chest pain and no diaphoresis. RESPIRATORY: No cough, no hemoptysis. GI: No vomiting, no diarrhea   : No urinary frequency and no dysuria. MUSCULOSKELETAL: No muscle pains. SKIN: No rashes.    NEURO: As above.  ENDOCRINE: No polyphagia and no polydipsia. HEMATOLOGY: No bleeding tendencies. Objective:     Vital Signs:  Patient Vitals for the past 24 hrs:   BP Temp Pulse Resp SpO2 Height Weight   04/12/17 0802 148/89 98 °F (36.7 °C) 88 19 97 % - -   04/12/17 0608 139/83 - 63 - - - -   04/11/17 2049 (!) 155/96 97.8 °F (36.6 °C) 97 19 95 % 5' 6\" (1.676 m) 91.1 kg (200 lb 13.4 oz)        Body mass index is 32.42 kg/(m^2). Physical Examination:  GENERAL SURVEY: Patient is awake, alert, oriented x 3, sitting comfortably on the chair, not in acute respiratory distress. HEENT: pink palpebral conjunctivae, anicteric sclerae, no nasoaural discharge, moist oral mucosa  NECK: supple, no jugular venous distention, no palpable lymph nodes  CHEST/LUNGS: symmetrical chest expansion, good air entry, clear breath sounds  HEART: adynamic precordium, good S1 S2, no S3, regular rhythm, no murmurs  ABDOMEN: flat, bowel sounds appreciated, soft, non-tender  EXTREMITIES: pink nailbeds, no edema, full and equal pulses, no calf tenderness   NEUROLOGICAL EXAM: The patient is awake, alert and oriented x3, able to answer questions fairly appropriately, able to follow 1 and 2 step commands. Able to tell time from the wall clock. Cranial nerves II-XII are grossly intact. No gross sensory deficit. Motor strength is 5/5 on the RUE and RLE, 4/5 on the LUE and LLE. (+) mild cognitive-linguistic deficits.       Current Medications:  Current Facility-Administered Medications   Medication Dose Route Frequency    acetaminophen (TYLENOL) tablet 650 mg  650 mg Oral Q4H PRN    docusate sodium (COLACE) capsule 100 mg  100 mg Oral BID    bisacodyl (DULCOLAX) tablet 10 mg  10 mg Oral Q48H PRN    heparin (porcine) injection 5,000 Units  5,000 Units SubCUTAneous Q8H    insulin lispro (HUMALOG) injection   SubCUTAneous TIDAC    aspirin (ASPIRIN) tablet 325 mg  325 mg Oral DAILY WITH BREAKFAST    atorvastatin (LIPITOR) tablet 40 mg  40 mg Oral QHS    lisinopril (PRINIVIL, ZESTRIL) tablet 20 mg  20 mg Oral DAILY    amLODIPine (NORVASC) tablet 10 mg  10 mg Oral DAILY    metFORMIN (GLUCOPHAGE) tablet 500 mg  500 mg Oral BID WITH MEALS    insulin detemir (LEVEMIR) injection 10 Units  10 Units SubCUTAneous QHS       Functional Assessment:     Occupational Therapy   Prior Level of Function  Pre-Admission Screen  Post-Admission Evaluation   Eating   Independent Eating   Supervision Eating  Functional Level: 6   Grooming   Independent Grooming   Supervision Grooming  Functional Level: 6   Upper Body Dressing   Independent Upper Body Dressing   Minimal Assist Upper Body Dressing  Functional Level: 7   Lower Body Dressing   Independent Lower Body Dressing   Minimal Assist Lower Body Dressing  Functional Level: 5   Bladder Management   Independent Bladder Management   Supervision Toileting  Functional Level: 5   Bowel Management   Independent Bowel Management   Supervision      Physical Therapy   Prior Level of Function  Pre-Admission Screen  Post-Admission Evaluation   Ambulation   Independent Ambulation   Minimal Assist Gait  Amount of Assistance: 5 (Supervision/setup)  Distance (ft): 220 Feet (ft) (x 2 sets)  Assistive Device: Walker, rolling   Bed Mobility   Independent Bed Mobility   Modified Independent Bed/Mat Mobility  Rolling Right : 5 (Supervision)  Rolling Left : 5 (Supervision)  Supine to Sit : 5 (Supervision)  Sit to Supine : 5 (Supervision)   Supine to Sit   Independent Supine to Sit   Modified Independent Bed/Mat Mobility  Rolling Right : 5 (Supervision)  Rolling Left : 5 (Supervision)  Supine to Sit : 5 (Supervision)  Sit to Supine : 5 (Supervision)   Sit to Stand   Independent Sit to Stand   Supervision Bed/Mat Mobility  Rolling Right : 5 (Supervision)  Rolling Left : 5 (Supervision)  Supine to Sit : 5 (Supervision)  Sit to Supine : 5 (Supervision)   Bed/Chair Transfers   Independent Bed/Chair Transfers   Minimal Assist Transfers  Transfer Type: Other  Other: Patient performs stand step transfer with SPV requiring initiall cues for safe hand placement though he demosntrates appropriate management of transfer ability otherwise. Transfer Assistance : 5 (Supervision/setup)  Sit to Stand Assistance: Supervision  Car Transfers: Not tested  Car Type: NA   Toilet Transfers   Independent Toilet Transfers   Minimal Assist Toilet Transfers  Toilet Transfer Score: 5     Speech and Language Pathology  Post-Admission Evaluation     Comprehension (Native Language)  Primary Mode of Comprehension: Auditory  Score: 6     Expression (Native Language)  Primary Mode of Expression: Verbal  Score: 7     Social Interaction/Pragmatics  Score: 6     Problem Solving  Score: 6     Memory  Score: 6       Legend:   7 - Independent   6 - Modified Independent   5 - [address] / Supervision / Set-up   4 - Minimum Assistance / Contact Guard Assistance   3 - Moderate Assistance   2 - Maximum Assistance   1 - Total Assistance / Dependent       Labs on Admission:  Recent Results (from the past 24 hour(s))   GLUCOSE, POC    Collection Time: 04/11/17 10:44 PM   Result Value Ref Range    Glucose (POC) 171 (H) 70 - 110 mg/dL   CBC WITH AUTOMATED DIFF    Collection Time: 04/12/17  6:18 AM   Result Value Ref Range    WBC 7.0 4.6 - 13.2 K/uL    RBC 4.85 4.70 - 5.50 M/uL    HGB 14.5 13.0 - 16.0 g/dL    HCT 41.2 36.0 - 48.0 %    MCV 84.9 74.0 - 97.0 FL    MCH 29.9 24.0 - 34.0 PG    MCHC 35.2 31.0 - 37.0 g/dL    RDW 13.4 11.6 - 14.5 %    PLATELET 988 211 - 689 K/uL    MPV 10.8 9.2 - 11.8 FL    NEUTROPHILS 63 40 - 73 %    LYMPHOCYTES 25 21 - 52 %    MONOCYTES 10 3 - 10 %    EOSINOPHILS 2 0 - 5 %    BASOPHILS 0 0 - 2 %    ABS. NEUTROPHILS 4.3 1.8 - 8.0 K/UL    ABS. LYMPHOCYTES 1.7 0.9 - 3.6 K/UL    ABS. MONOCYTES 0.7 0.05 - 1.2 K/UL    ABS. EOSINOPHILS 0.2 0.0 - 0.4 K/UL    ABS.  BASOPHILS 0.0 0.0 - 0.1 K/UL    DF AUTOMATED     MAGNESIUM    Collection Time: 04/12/17  6:18 AM   Result Value Ref Range    Magnesium 2.2 1.6 - 2.6 mg/dL   METABOLIC PANEL, BASIC    Collection Time: 04/12/17  6:18 AM   Result Value Ref Range    Sodium 140 136 - 145 mmol/L    Potassium 3.8 3.5 - 5.5 mmol/L    Chloride 107 100 - 108 mmol/L    CO2 25 21 - 32 mmol/L    Anion gap 8 3.0 - 18 mmol/L    Glucose 174 (H) 74 - 99 mg/dL    BUN 19 (H) 7.0 - 18 MG/DL    Creatinine 1.18 0.6 - 1.3 MG/DL    BUN/Creatinine ratio 16 12 - 20      GFR est AA >60 >60 ml/min/1.73m2    GFR est non-AA >60 >60 ml/min/1.73m2    Calcium 8.4 (L) 8.5 - 10.1 MG/DL   VITAMIN D, 25 HYDROXY    Collection Time: 04/12/17  6:18 AM   Result Value Ref Range    Vitamin D 25-Hydroxy 22.6 (L) 30 - 100 ng/mL   HEMOGLOBIN A1C W/O EAG    Collection Time: 04/12/17  6:18 AM   Result Value Ref Range    Hemoglobin A1c 10.5 (H) 4.2 - 5.6 %   HEPATIC FUNCTION PANEL    Collection Time: 04/12/17  6:18 AM   Result Value Ref Range    Protein, total 6.8 6.4 - 8.2 g/dL    Albumin 3.3 (L) 3.4 - 5.0 g/dL    Globulin 3.5 2.0 - 4.0 g/dL    A-G Ratio 0.9 0.8 - 1.7      Bilirubin, total 0.7 0.2 - 1.0 MG/DL    Bilirubin, direct 0.2 0.0 - 0.2 MG/DL    Alk.  phosphatase 114 45 - 117 U/L    AST (SGOT) 40 (H) 15 - 37 U/L    ALT (SGPT) 65 (H) 16 - 61 U/L   LIPID PANEL    Collection Time: 04/12/17  6:18 AM   Result Value Ref Range    LIPID PROFILE          Cholesterol, total 141 <200 MG/DL    Triglyceride 284 (H) <150 MG/DL    HDL Cholesterol 29 (L) 40 - 60 MG/DL    LDL, calculated 55.2 0 - 100 MG/DL    VLDL, calculated 56.8 MG/DL    CHOL/HDL Ratio 4.9 0 - 5.0     GLUCOSE, POC    Collection Time: 04/12/17  7:33 AM   Result Value Ref Range    Glucose (POC) 155 (H) 70 - 110 mg/dL   GLUCOSE, POC    Collection Time: 04/12/17 11:43 AM   Result Value Ref Range    Glucose (POC) 220 (H) 70 - 110 mg/dL       Estimated Glomerular Filtration Rate:  On admission, based on a Creatinine of 1.18 mg/dl:   Estimated GFR using CKD-EPI = 75.1 mL/min/1.73m2   Estimated GFR using MDRD = 71.6 mL/min/1.73m2      Assessment:     Primary Rehabilitation Diagnosis  1. Impaired Mobility and ADLs  2. Acute Ischemic Stroke (acute right basal ganglia region infarct) with residual left hemiparesis and mild cognitive-linguistic deficits    Comorbidities   Hypertensive heart disease without heart failure    Diastolic dysfunction without heart failure    Dyslipidemia with low high density lipoprotein (HDL) cholesterol with hypertriglyceridemia due to type 2 diabetes mellitus    New onset type 2 diabetes mellitus     Obesity, Class I, BMI 30-34.9    Vitamin D insufficiency       Willingness to participate in the program: Good      Rehabilitation Potential: Good      Plan:     1. Medical Issues being followed closely:    [x]  Fall and safety precautions     []  Wound Care     [x]  Bowel and Bladder Function     [x]  Fluid Electrolyte and Nutrition Balance     []  Pain Control      2. Issues that 24 hour rehabilitation nursing is following:    [x]  Fall and safety precautions     []  Wound Care     [x]  Bowel and Bladder Function     [x]  Fluid Electrolyte and Nutrition Balance     []  Pain Control      [x]  Assistance with and education on in-room safety with transfers to and from the bed, wheelchair, toilet and shower. 3. Acute rehabilitation plan of care:    [x]  Patient to be evaluated and treated by:           [x]  Physical Therapy           [x]  Occupational Therapy           [x]  Speech Therapy     []  Hold Rehab until further notice     5. Medications:    [x]  MAR Reviewed     [x]  Continue Present Medications     6. DVT Prophylaxis:      []  Lovenox     [x]  Unfractionated Heparin     []  Coumadin     []  NOAC     [x]  MARY Stockings     []  Sequential Compression Device     []  None     7. Rehabilitation program and expectations from patient, as well as medical issues discussed with the patient. REHABILITATION PLAN:    1.  The patient is being admitted to a comprehensive acute inpatient rehabilitation program consisting of at least 180 minutes a day, 5 out of 7 days a week of  combined physical, occupational and speech therapy, and close supervision by a physician with special training and experience in rehabilitation medicine. 2. The patient's prognosis for significant practical improvement within a reasonable period of time appears to be good. 3. The estimated length of stay is 17 days. 4. The patient/family has a good understanding of our discharge process. The patient has potential to make improvement and is in need of at least two of the following multidisciplinary therapies including but not limited to physical, occupational, speech, nutritional services, wound care, prosthetics and orthotics. The patient is expected to be able to return to home with outpatient therapy and family support. 5. Given the patient's multiple co-morbidities and risk for further medical complications, rehabilitation services could not be safely provided at a lower level of care such as a skilled nursing facility. 6. Physical therapy for therapeutic exercise, progressive mobility, gait training, transfer training, bed mobility training, patient and family education, and wheelchair mobility training. Physical therapy goals to address  extremity function, range of motion, balance, safety awareness, independence in transfers, activity tolerance, independence in bed mobility, and independence in ambulation. 7. Occupational therapy for self-care home management, transfer training, therapeutic exercise, activity, wheelchair mobility training. Occupational therapy goals to address  extremity function, cognition, balance, activity tolerance, independence in functional transfers, range of motion, safety awareness, independence in ADL  and independence in home management skills. 8. Speech and Language Pathology for cognitive training, dysphagia therapy, speech and language communication therapy.  Goals for speech therapy to address cognition, expression of language, comprehension, safety awareness and safe swallow. 9. Specialized 24 hour rehabilitation nursing care for bowel and bladder retraining, disease management, pain management, pressure ulcer prevention and management per policy, education on pressure relief techniques, embolism prevention, nutrition management, hydration management, transfer training and   medication distribution. 10. Nutrition and Dietary services will be obtained for assessment of adequate calorie needs, hydration and calorie counts as appropriate. 11. Therapeutic recreation for leisure skills. 15. Rehab psychology for coping skills. 15. Social work services for patient and family counseling and safe discharge planning. 14. I will be in charge of the inpatient rehab program. Full details of the inpatient rehab program will be outlined in the initial team conference. REHABILITATION GOALS:  Improve functional and activities of daily living skills in order to return back to independent living with family support.        MEDICAL PLAN:  > Acute Ischemic Stroke (acute right basal ganglia region infarct) with residual left hemiparesis and mild cognitive-linguistic deficits   > Aspirin 325 mg PO once daily with breakfast   > Atorvastatin 40 mg PO q HS    > Dyslipidemia with low high density lipoprotein (HDL) cholesterol with hypertriglyceridemia   > Patient was started on Atorvastatin 40 mg Po q HS at [facility]    > Lipid profile (4/12/2017): , , HDL 29, LDL 55   > Add Lovaza 1 gram PO BID   > Continue Atorvastatin 40 mg PO q HS    > Hypertensive heart disease without heart failure   > Discontinue Hydrochlorothiazide 25 mg PO once daily   > Increase Amlodipine from 5 mg to 10 mg PO once daily (6AM)   > Increase Lisinopril from 20 mg to 40 mg PO once daily (9AM)   > Add Hydralazine 25 mg PO TID PRN for SBP greater than 150 mmHg or DBP greater than 100 mmHg    > Type 2 diabetes mellitus, newly diagnosed   > HbA1c (4/12/2017) = 10.5   > Metformin 500 mg PO BID with meals   > Decrease Levemir from 18 units to 15 units SC q HS   > Humalog insulin sliding scale SC TID AC only    > Vitamin D Insufficiency   > Vitamin D 25-Hydroxy (4/12/2017) = 22.6   > Cholecalciferol 50,000 units PO x 1 dose today   > Cholecalciferol 2,000 units PO once daily (starting tomorrow)       PRECAUTIONS:   1. Safety/fall precautions. 2. Deep venous thrombosis precautions. 3. Aspiration precautions. POTENTIAL BARRIERS TO DISCHARGE:  1. Risk for falls. 2. The patient lives alone. 3. Family limited in ability to provide constant care. RELEVANT CHANGES SINCE PREADMISSION SCREENING: I have compared the patients medical and functional status at the time of the pre-admission screening and on this post-admission evaluation. The preadmission screen and findings from therapy evaluations both support my post admission physician evaluation, deeming this patient to be an appropriate candidate for the IRF. The patient requires multidisciplinary treatment, physician oversight and intensive therapy not provided at a lower level of care. By signing this document, I acknowledge that I have personally performed a full physical examination on this patient within 24 hours of admission to this inpatient rehabilitation facility and have determined the patient to be able to tolerate the above course of treatment at an intensive level for a reasonable period of time. I will be completing a detailed individualized plan of care for this patient by day #4 of the patients stay based upon the Pre-Admission Screen, the Post-Admission Evaluation, and the therapy evaluations.       Signed:    Edil Ch MD    April 12, 2017

## 2017-04-12 NOTE — PROGRESS NOTES
Problem: Self Care Deficits Care Plan (Adult)  Goal: *Acute Goals and Plan of Care (Insert Text)  Short Term Goals = Long Term Goals (to be met upon discharge date) in order to increase pts functional independence and safety, and decrease burden of care:  1. Pt will perform self-feeding with independence. 2. Pt will perform grooming with independence. 3. Pt will perform UB bathing with modified independence. 4. Pt will perform LB bathing with modified independence. 5. Pt will perform tub/shower transfer with modified independence. 6. Pt will perform UB dressing with independence. 7. Pt will perform LB dressing with modified independence. 8. Pt will perform toileting task with modified independence. 9. Pt will perform toilet transfer with modified independence. OCCUPATIONAL THERAPY EXAMINATION     Patient Name: Giuseppe Farris  Patient Age: 36 y.o.   Past Medical History:   Past Medical History:   Diagnosis Date    Acute ischemic stroke ([facility] 4/6/2017     Acute Ischemic Stroke (acute right basal ganglia region infarct) with residual left hemiparesis    Diastolic dysfunction without heart failure      Dyslipidemia with low high density lipoprotein (HDL) cholesterol with hypertriglyceridemia due to type 2 diabetes mellitus ([facility] 4/6/2017     Lipid profile (4/12/2017): , , HDL 29, LDL 55    Hemiparesis affecting left side as late effect of cerebrovascular accident (CVA) ([facility] 4/6/2017    New onset type 2 diabetes mellitus ([facility] 04/06/2017     HbA1c (4/12/2017) = 10.5    Obesity, Class I, BMI 30-34.9      Vitamin D insufficiency 4/12/2017     Vitamin D 25-Hydroxy (4/12/2017) = 22.6        Medical Diagnosis:  CVA  Acute ischemic stroke ([facility] Acute ischemic stroke ([facility]   Therapy Diagnosis:   Difficulty with ADLs  [X]     Difficulty with functional transfers  [X]     Difficulty with ambulation  [X]     Difficulty with IADLs  [X]        Problem List:    Decreased strength B UE  [X] Decreased strength trunk/core  [X]     Decreased AROM   [ ]     Decreased endurance  [X]     Decreased balance sitting  [ ]     Decreased balance standing  [X]     Pain   [ ]     Decreased PROM  [ ]        Functional Limitations:   Decreased independence with ADL  [X]     Decreased independence with functional transfers  [X]     Decreased independence with ambulation  [X]     Decreased independence with IADL  [X]        Previous Functional Level:  Pt reports he was independence with all self-care and functional mobility and works at [employer]. Home Environment: Home Situation  Home Environment: Trailer/mobile home  # Steps to Enter: 4  One/Two Story Residence: One story  Living Alone: Yes  Support Systems: Friends \ neighbors, Parent  Patient Expects to be Discharged to[de-identified] Private residence  Current DME Used/Available at Home: None  Tub or Shower Type: Tub/Shower combination     Precautions: Fall     Time In: 1330  Time Out: 1430     Pain at start of tx: 0/10  Pain at stop of tx: 0/10     Patient identified with name and : yes  Objective: Pt was seen for initial OT evaluation. He was very pleasant, cooperative, motivated, and engaged throughout the session. Pt performed full body shower; see below for ADL and functional transfers/mobility. He demonstrates increased fatigue and SOB following completion of shower and required rest break before participating in additional activities. Pt able to ambulate on the unit without an AD to/from his room in order to obtain clothing and toiletries in preparation for and after completing shower. Pt performed FM task placing 9 1\" pegs into pegboard and then remove them one at a time and maintain palmar grasp of all pegs while removing them. Pt able to complete the task with his R hand in 25 seconds.  Pt demonstrated significantly increased difficulty with attempting to the complete the same task using his L hand, therefore timed challenge was removed from the activity, and pt was able to slow down and focus on using pincer grasp to  pegs. He was able to place all 9 pegs with significantly increased time and effort required. Pt also participated in small bead retrieval and other various theraputty exercises using red theraputty for increased L hand strength and coordination. Outcome Measures:                  MMT Initial Assessment    Right Upper Extremity  Left Upper Extremity    UE AROM  WNL; Grossly 4+/5  WFL; Grossly 4/5   Shoulder flexion  -  -   Shoulder extension  -  -   Shoulder ABDuction  -  -   Shoulder ADDUction  -  -   Elbow Flexion  -  -   Elbow Extension  -  -   Wrist Extension/Flexion  -  -     -  -   0/5       No palpable muscle contraction  1/5       Palpable muscle contraction, no joint movement  2-/5      Less than full range of motion in gravity eliminated position  2/5       Able to complete full range of motion in gravity eliminated position  2+/5     Able to initiate movement against gravity  3-/5      More than half but not full range of motion against gravity  3/5       Able to complete full range of motion against gravity  3+/5     Completes full range of motion against gravity with minimal resistance  4-/5      Completes full range of motion against gravity with minimal-moderate resistance  4/5       Completes full range of motion against gravity with moderate resistance  4+/5     Completes full range of motion against gravity with moderate-maximum resistance  5/5       Completes full range of motion against gravity with maximum resistance     Sensation: Intact UEs  Coordination: Impaired LUE      FIM SCORES Initial Assessment   Bladder - level of assist -   Bladder - accident frequency score -   Bowel - level of assist -   Bowel - accident frequency score -   Please see IRC Interdisciplinary Eval: Coordination/Balance Section for details regarding FIM score description.       COGNITION/PERCEPTION Initial Assessment   Premorbid Reading Status Literate   Premorbid Writing Status WFL   Arousal/Alertness Generalized responses   Orientation Level Oriented X4   Visual Fields  (Intact)   Praxis Intact   Body Scheme Appears intact       COMPREHENSION MODE Initial Assessment   Primary Mode of Comprehension Auditory   Hearing Aide     Corrective Lenses Glasses   Score 6       EXPRESSION Initial Assessment   Primary Mode of Expression Verbal   Score 6   Comments         SOCIAL INTERACTION/PRAGMATICS Initial Assessment   Score 6   Comments         PROBLEM SOLVING Initial Assessment   Score 5   Comments         MEMORY Initial Assessment   Score 5   Comments         EATING Initial Assessment   Functional Level 6   Comments Pt requires slightly additional time and effort for opening packages and containers due to decreased L hand coordination. Pt able to feed himself using his dominant, unaffected RUE. GROOMING Initial Assessment   Functional Level 6     Oral Hygiene FIM: 6   Tasks completed by patient     Comments Pt may required increased time for opening snf managing toothpaste due to decreased L hand coordination. BATHING Initial Assessment   Functional Level 5   Body parts patient bathed Abdomen, Arm, left, Arm, right, Buttocks, Chest, Lower leg and foot, left, Lower leg and foot, right, Stephanie area, Thigh, left, Thigh, right   Comments Pt completed full body bathing while standing in the shower with supervision for safety only. He was able to steady himself with 1 hand while bending forward to wash lower legs and feet. TUB/SHOWER TRANSFER INDEPENDENCE Initial Assessment   Score 4   Comments Pt stepped in and out of tub utilizing 1 hand on the wall for support, requiring Min A for steading balance as pt caught his L toes on edge of the tub while attempting to step in.         UPPER BODY DRESSING/UNDRESSING Initial Assessment   Functional Level 7   Items applied/Steps completed Pullover (4 steps)   Comments         LOWER BODY DRESSING/UNDRESSING Initial Assessment   Functional Level 5     Sock and/or Shoe Management FIM: 6   Items applied/Steps completed Elastic waist pants (3 steps), Fasten shoe (1 step), Shoe, left (1 step), Shoe, right (1 step), Sock, left (1 step), Sock, right (1 step), Underpants (3 steps)   Comments Pt is able to stand with supervision for safety while stepping in and out of underwear and pants. Pt self-selects to sit down for doffing and donning socks and tennis shoes with increased time required for tying laces due to decreased L hand coordination. TOILETING Initial Assessment   Functional Level 5   Comments Pt performs toileting task with distant supervision for safety with standing only. TOILET TRANSFER INDEPENDENCE Initial Assessment   Transfer score 5   Comments Pt able to perform stand step transfer to/from commode with distant supervision for safety only. INSTRUMENTAL ADL Initial Assessment (PLOF)   Meal preparation Independent   Homemaking Independent   Medicine Management Independent   Financial Management Independent      OCCUPATIONAL THERAPY PLAN OF CARE  Areas to Assess:   Self Care/Functional transfer/IADL  [X]     NMRE/ estim  [X]     UE Ther ex/Ther act  [X]     Cognitive Training  [X]     Patient/Family/Caregiver Education  [X]       Order received from MD for occupational therapy services and chart reviewed. Pt to be seen 5 times per week for 3 hours of total therapy per day for 1 week. Thank you for the referral.     LTGs: See Care Plan     Pt would benefit from skilled occupational therapy in order to improve independent functional mobility/ADLs,/IADLs within the home. Interventions may include range of motion (AROM, PROM B UE), motor function (B UE/ strengthening/coordination), activity tolerance (vitals, oxygen saturation levels), balance training, ADL/IADL training and functional transfer training. Please see IRC;  Interdisciplinary Eval, Care Plan, and Patient Education for further information regarding occupational therapy examination and plan of care.       German Johnson, OTNANDA  4/12/2017

## 2017-04-12 NOTE — PROGRESS NOTES
NUTRITION     Nutrition Consult: General Nutrition Management & Supplements      RECOMMENDATIONS / PLAN:     - No nutrition intervention indicated at this time. Will re-screen as appropriate. NUTRITION INTERVENTIONS & DIAGNOSIS:     Nutrition Diagnosis:  No nutrition diagnosis at this time. ASSESSMENT:     Subjective/Objective:  Patient reported good appetite and meal intake PTA and since admission. Pt with question regarding diet order. Question answered to his satisfaction     Average po intake adequate to meet patients estimated nutritional needs:   [x] Yes     [] No   [] Unable to determine at this time    Diet: DIET DIABETIC CONSISTENT CARB Regular; AHA-LOW-CHOL FAT; No Conc. Sweets      Food Allergies: none known   Current Appetite:   [x] Good     [] Fair     [] Poor     [] Other:  Appetite/meal intake prior to admission:   [x] Good     [] Fair     [] Poor     [] Other:  Feeding Limitations:  [] Swallowing difficulty    [] Chewing difficulty    [] Other:  Current Meal Intake: Patient Vitals for the past 100 hrs:   % Diet Eaten   04/12/17 1310 100 %   04/12/17 0958 100 %       BM: 4/11   Skin Integrity:  No pressure ulcer or wound  Edema:  None   Pertinent Medications: Reviewed    Recent Labs      04/12/17   0618   NA  140   K  3.8   CL  107   CO2  25   GLU  174*   BUN  19*   CREA  1.18   CA  8.4*   MG  2.2   ALB  3.3*   SGOT  40*   ALT  65*       Intake/Output Summary (Last 24 hours) at 04/12/17 1638  Last data filed at 04/12/17 1310   Gross per 24 hour   Intake              580 ml   Output                0 ml   Net              580 ml       Anthropometrics:  Ht Readings from Last 1 Encounters:   04/11/17 5' 6\" (1.676 m)     Last 3 Recorded Weights in this Encounter    04/11/17 2049   Weight: 91.1 kg (200 lb 13.4 oz)     Body mass index is 32.42 kg/(m^2).           Weight History:  Patient denied experiencing any recent changes in weight PTA    Weight Metrics 4/11/2017   Weight 200 lb 13.4 oz   BMI 32.42 kg/m2        Admitting Diagnosis: CVA  Acute ischemic stroke [facility])  Past Medical History:   Diagnosis Date    Acute ischemic stroke ([facility] 4/6/2017    Acute Ischemic Stroke (acute right basal ganglia region infarct) with residual left hemiparesis    Diastolic dysfunction without heart failure     Dyslipidemia with low high density lipoprotein (HDL) cholesterol with hypertriglyceridemia due to type 2 diabetes mellitus ([facility] 4/6/2017    Lipid profile (4/12/2017): , , HDL 29, LDL 55    Hemiparesis affecting left side as late effect of cerebrovascular accident (CVA) ([facility] 4/6/2017    New onset type 2 diabetes mellitus ([facility] 04/06/2017    HbA1c (4/12/2017) = 10.5    Obesity, Class I, BMI 30-34.9     Vitamin D insufficiency 4/12/2017    Vitamin D 25-Hydroxy (4/12/2017) = 22.6       Education Needs:        [x] None identified  [] Identified - Not appropriate at this time  []  Identified and addressed - refer to education log  Learning Limitations:   [x] None identified  [] Identified    Cultural, Confucianism & ethnic food preferences:  [x] None identified    [] Identified and addressed     ESTIMATED NUTRITION NEEDS:     Calories: 5123-1797 kcal (MSJx1.2-1.3) based on  [x] Actual BW: 91 kg      [] IBW   CHO: 262-284 gm (50% kcal)   Protein: 73-91 gm (0.8-1 gm/kg) based on  [x] Actual BW      [] IBW   Fluid: 1 mL/kcal     MONITORING & EVALUATION:     Nutrition Goal(s):   1. Po intake of meals will meet >75% of patient estimated nutritional needs within the next 7 days.   Outcome:  [] Met/Ongoing    []  Not Met    [x] New/Initial Goal     Monitoring:   [x] Diet tolerance   [x] Meal intake   [] Supplement intake   [] GI symptoms/ability to tolerate po diet   [] Respiratory status   [] Plan of care      Previous Recommendations (for follow-up assessments only):     []   Implemented       []   Not Implemented (RD to address)     [] No Recommendation Made     Discharge Planning:  Diabetic, cardiac diet   [x] Participated in care planning, discharge planning, & interdisciplinary rounds as appropriate      Brock Meier, [address] [place] [address]   Pager: [phone]

## 2017-04-13 PROBLEM — E11.22 TYPE 2 DIABETES MELLITUS WITH STAGE 2 CHRONIC KIDNEY DISEASE (HCC): Status: ACTIVE | Noted: 2017-04-06

## 2017-04-13 PROBLEM — N18.2 DIABETES MELLITUS WITH STAGE 2 CHRONIC KIDNEY DISEASE (HCC): Status: ACTIVE | Noted: 2017-04-06

## 2017-04-13 PROBLEM — N18.2 TYPE 2 DIABETES MELLITUS WITH STAGE 2 CHRONIC KIDNEY DISEASE (HCC): Status: ACTIVE | Noted: 2017-04-06

## 2017-04-13 PROBLEM — N18.2 CHRONIC KIDNEY DISEASE (CKD) STAGE G2/A1, MILDLY DECREASED GLOMERULAR FILTRATION RATE (GFR) BETWEEN 60-89 ML/MIN/1.73 SQUARE METER AND ALBUMINURIA CREATININE RATIO LESS THAN 30 MG/G: Chronic | Status: ACTIVE | Noted: 2017-04-12

## 2017-04-13 PROBLEM — E11.22 DIABETES MELLITUS WITH STAGE 2 CHRONIC KIDNEY DISEASE (HCC): Status: ACTIVE | Noted: 2017-04-06

## 2017-04-13 LAB
GLUCOSE BLD STRIP.AUTO-MCNC: 101 MG/DL (ref 70–110)
GLUCOSE BLD STRIP.AUTO-MCNC: 148 MG/DL (ref 70–110)
GLUCOSE BLD STRIP.AUTO-MCNC: 157 MG/DL (ref 70–110)
GLUCOSE BLD STRIP.AUTO-MCNC: 212 MG/DL (ref 70–110)
HCT VFR BLD AUTO: 40.4 % (ref 36–48)
HCYS SERPL-SCNC: 9.1 UMOL/L (ref 0–15)
HGB BLD-MCNC: 13.8 G/DL (ref 13–16)
PLATELET # BLD AUTO: 232 K/UL (ref 135–420)

## 2017-04-13 PROCEDURE — 85018 HEMOGLOBIN: CPT | Performed by: INTERNAL MEDICINE

## 2017-04-13 PROCEDURE — 85049 AUTOMATED PLATELET COUNT: CPT | Performed by: INTERNAL MEDICINE

## 2017-04-13 PROCEDURE — 74011250637 HC RX REV CODE- 250/637: Performed by: INTERNAL MEDICINE

## 2017-04-13 PROCEDURE — 74011250636 HC RX REV CODE- 250/636: Performed by: INTERNAL MEDICINE

## 2017-04-13 PROCEDURE — 97110 THERAPEUTIC EXERCISES: CPT

## 2017-04-13 PROCEDURE — 92507 TX SP LANG VOICE COMM INDIV: CPT

## 2017-04-13 PROCEDURE — 97530 THERAPEUTIC ACTIVITIES: CPT

## 2017-04-13 PROCEDURE — 65310000000 HC RM PRIVATE REHAB

## 2017-04-13 PROCEDURE — 97116 GAIT TRAINING THERAPY: CPT

## 2017-04-13 PROCEDURE — 36415 COLL VENOUS BLD VENIPUNCTURE: CPT | Performed by: INTERNAL MEDICINE

## 2017-04-13 PROCEDURE — 82962 GLUCOSE BLOOD TEST: CPT

## 2017-04-13 PROCEDURE — 74011636637 HC RX REV CODE- 636/637: Performed by: INTERNAL MEDICINE

## 2017-04-13 PROCEDURE — 97112 NEUROMUSCULAR REEDUCATION: CPT

## 2017-04-13 RX ADMIN — METFORMIN HYDROCHLORIDE 500 MG: 500 TABLET, FILM COATED ORAL at 18:09

## 2017-04-13 RX ADMIN — METFORMIN HYDROCHLORIDE 500 MG: 500 TABLET, FILM COATED ORAL at 08:22

## 2017-04-13 RX ADMIN — GLIPIZIDE 5 MG: 5 TABLET ORAL at 08:22

## 2017-04-13 RX ADMIN — LISINOPRIL 40 MG: 20 TABLET ORAL at 08:25

## 2017-04-13 RX ADMIN — INSULIN DETEMIR 15 UNITS: 100 INJECTION, SOLUTION SUBCUTANEOUS at 21:42

## 2017-04-13 RX ADMIN — VITAMIN D, TAB 1000IU (100/BT) 2000 UNITS: 25 TAB at 08:25

## 2017-04-13 RX ADMIN — INSULIN LISPRO 2 UNITS: 100 INJECTION, SOLUTION INTRAVENOUS; SUBCUTANEOUS at 12:34

## 2017-04-13 RX ADMIN — LABETALOL HYDROCHLORIDE 100 MG: 100 TABLET, FILM COATED ORAL at 21:34

## 2017-04-13 RX ADMIN — HEPARIN SODIUM 5000 UNITS: 5000 INJECTION, SOLUTION INTRAVENOUS; SUBCUTANEOUS at 15:18

## 2017-04-13 RX ADMIN — ASPIRIN 325 MG ORAL TABLET 325 MG: 325 PILL ORAL at 08:22

## 2017-04-13 RX ADMIN — HEPARIN SODIUM 5000 UNITS: 5000 INJECTION, SOLUTION INTRAVENOUS; SUBCUTANEOUS at 21:37

## 2017-04-13 RX ADMIN — LABETALOL HYDROCHLORIDE 100 MG: 100 TABLET, FILM COATED ORAL at 00:07

## 2017-04-13 RX ADMIN — AMLODIPINE BESYLATE 10 MG: 5 TABLET ORAL at 06:21

## 2017-04-13 RX ADMIN — OMEGA-3-ACID ETHYL ESTERS 1000 MG: 900 CAPSULE, LIQUID FILLED ORAL at 18:08

## 2017-04-13 RX ADMIN — ATORVASTATIN CALCIUM 40 MG: 40 TABLET, FILM COATED ORAL at 21:34

## 2017-04-13 RX ADMIN — INSULIN LISPRO 2 UNITS: 100 INJECTION, SOLUTION INTRAVENOUS; SUBCUTANEOUS at 08:47

## 2017-04-13 RX ADMIN — LABETALOL HYDROCHLORIDE 100 MG: 100 TABLET, FILM COATED ORAL at 08:22

## 2017-04-13 RX ADMIN — OMEGA-3-ACID ETHYL ESTERS 1000 MG: 900 CAPSULE, LIQUID FILLED ORAL at 08:22

## 2017-04-13 RX ADMIN — HEPARIN SODIUM 5000 UNITS: 5000 INJECTION, SOLUTION INTRAVENOUS; SUBCUTANEOUS at 06:21

## 2017-04-13 NOTE — PROGRESS NOTES
Met with patient and mother. Pt consented to conversation about discharge outpatient therapies with mother present. Pt and mother requested outpatient clinic in [facility]. PT evaluation appointment made for 4/24/17 9:00am with a check in at 8:45am (first available) at [address] [place] [address].  added note to chart to request earlier appointment in event of a cancellation. SLP evaluation appointment made for 5/1/17 9:00am (first available) at same site. Family advised of appointments    OT appointment made at same site for 5/1 2:30pm. Pt has MD appointment at 10:15am 5/1/17 per  at outpatient.    All orders faxed 4/14/17    Abby Biswas

## 2017-04-13 NOTE — PROGRESS NOTES
Problem: Neurolinguistics Impaired (Adult)  Goal: *Speech Goal: (INSERT TEXT)  Long term goals:  1. Patient will recall short lists of up to 5 related items with 90% accuracy. 2. Patient will perform varied functional problem solving tasks (higher level) with 90% accuracy. 3. Patient will read paragraphs and respond to content questions with % accuracy. 4. Patient will discuss commonly heard figurative language (idioms) with 90% accuracy. Short term goals (by 4/19/17):  1. Patient will recall short lists of up to 5 related items with 75-85%% accuracy. 2. Patient will perform varied functional problem solving tasks (higher level) with 75-85% accuracy. 3. Patient will read paragraphs and respond to content questions with 80-90% accuracy. 4. Patient will discuss commonly heard figurative language (idioms) with 75-85% accuracy. SPEECH LANGUAGE PATHOLOGY TREATMENT     Patient: Feroz Drummond (83 y.o. male)  Date: 4/13/2017  Diagnosis: CVA  Acute ischemic stroke ([facility]) Acute ischemic stroke [facility])       SUBJECTIVE:   Patient stated Dhruv Cook was in the gym. OBJECTIVE:   Mental Status:  Patient was alert and oriented during today's half hour session. Treatment & Interventions:   Mr. Charissa Santoro participated in the following activities this morning:     Neuro-Linguistics:   Orientation:                 Supervision  Recent memory:         Supervision  Recall 4 word lists:     80% accuracy  Recall 5 word lists:     80% accuracy  Divergent naming:              Cartoon characters:    Patient named 15 with supervision              Places to keep $$:      Patient named 6/more with cues from the SLP              Things you pull:          Patient listed 10/more with cues from the SLP  Solving Problems:                  100% providing 2 solutions for each presented     Response & Tolerance to Activities:  Mr. Charissa Santoro was cheerful and cooperative in today's session.   He appeared to enjoy the tasks presented today.     Pain:  Pain Scale 1: Numeric (0 - 10)     After treatment:   [X]       Patient left in no apparent distress sitting up in chair  [ ]       Patient left in no apparent distress in bed  [X]       Call bell left within reach  [ ]       Nursing notified  [ ]       Caregiver present  [ ]       Bed alarm activated      ASSESSMENT:   Mr. Obie Harper was tired after a session in the gym, but responses were quite good this morning. Progression toward goals:  [X]       Improving appropriately and progressing toward goals  [ ]       Improving slowly and progressing toward goals  [ ]       Not making progress toward goals and plan of care will be adjusted      PLAN:   Patient continues to benefit from skilled intervention to address the above impairments. Continue treatment per established plan of care.   Discharge Recommendations:  Outpatient     CHASTITY Thompson  Time Calculation: 30 mins

## 2017-04-13 NOTE — INTERDISCIPLINARY ROUNDS
[facility] PHYSICAL REHABILITATION  [address] [place] [address], [address]    INPATIENT REHABILITATION  TEAM CONFERENCE SUMMARY     Date of Conference: 4/14/2017    Name: Lopez Price Age / Sex: 36 y.o. / male   CSN: [id number] MRN: [id number]   [facility] Date: 4/11/2017 Length of Stay: 2 days     Primary Rehabilitation Diagnosis  1. Impaired Mobility and ADLs  2. Acute Ischemic Stroke (acute right basal ganglia region infarct) with residual left hemiparesis and mild cognitive-linguistic deficits      Comorbidities   Hypertensive heart disease without heart failure    Diastolic dysfunction without heart failure    Dyslipidemia with low high density lipoprotein (HDL) cholesterol with hypertriglyceridemia due to type 2 diabetes mellitus    Chronic kidney disease (CKD) stage G2/A1, mildly decreased glomerular filtration rate (GFR) between 60-89 mL/min/1.73 square meter and albuminuria creatinine ratio less than 30 mg/g    Type 2 diabetes mellitus with stage 2 chronic kidney disease     Obesity, Class I, BMI 30-34.9    Vitamin D insufficiency         Therapy:     FIM SCORES Initial Assessment Weekly Progress Assessment 4/13/2017   Eating Functional Level: 6  Comments: Pt requires slightly additional time and effort for opening packages and containers due to decreased L hand coordination. Pt able to feed himself using his dominant, unaffected RUE. 6 - Mod I   Swallowing     Grooming 6  6 - Mod I   Bathing 5  5 - Distant supervision      Upper Body Dressing Functional Level: 7  Items Applied/Steps Completed: Pullover (4 steps)  7 - Independent   Lower Body Dressing Functional Level: 5  Items Applied/Steps Completed: Elastic waist pants (3 steps), Fasten shoe (1 step), Shoe, left (1 step), Shoe, right (1 step), Sock, left (1 step), Sock, right (1 step), Underpants (3 steps)  Comments: Pt is able to stand with supervision for safety while stepping in and out of underwear and pants.  Pt self-selects to sit down for doffing and donning socks and tennis shoes with increased time required for tying laces due to decreased L hand coordination. 5 - Distant Supervision   Toileting Functional Level: 5  Comments: Pt performs toileting task with distant supervision for safety with standing only. 5 - Distant Supervision   Bladder  level of assist 5 5   Bladder  accident frequency score 6 6   Bowel  level of assist 5 5   Bowel  accident frequency score 6     Toilet Transfer [place] Toilet Transfer Score: 5  Comments: Pt able to perform stand step transfer to/from commode with distant supervision for safety only. 5 - Distant supervision   Tub/Shower Transfer [place] Tub or Shower Type: Tub/Shower combination  Tub/Shower Transfer Score: 4  Comments: Pt stepped in and out of tub utilizing 1 hand on the wall for support, requiring Min A for steading balance as pt caught his L toes on edge of the tub while attempting to step in.   5 - Distant supervision      Comprehension Primary Mode of Comprehension: Auditory  Score: 6     Score: 6   Expression Primary Mode of Expression: Verbal  Score: 6     Score: 6   Social Interaction Score: 6  Score: 6   Problem Solving Score: 5  Score: 5   Memory Score: 4  Score: 4     FIM SCORES Initial Assessment Weekly Progress Assessment 4/13/2017   Bed/Chair/Wheelchair Transfers Transfer Type: Other  Other: Patient performs stand step transfer with SPV requiring initiall cues for safe hand placement though he demosntrates appropriate management of transfer ability otherwise.   Transfer Assistance : 5 (Supervision/setup)  Sit to Stand Assistance: Supervision  Car Transfers: Not tested  Car Type: NA Mod I stand step transfer   Bed Mobility Rolling Right 5 (Supervision)   Rolling Left 5 (Supervision)   Supine to Sit 5 (Supervision)   Sit to Stand Supervision   Sit to Supine 5 (Supervision)    Rolling Right   Mod I   Rolling Left   Mod I   Supine to Sit   Mod I   Sit to Stand   Mod I Sit to Supine   Mod I      Locomotion (W/C) Able to Propel (ft): 300 feet  Functional Level: 6  Curbs/Ramps Assist Required (FIM Score): 0 (Not tested)  Wheelchair Setup Assist Required : 5 (Supervision/setup)  Wheelchair Management: Manages left brake, Manages right brake, Manages left footrest, Manages right footrest Function 6  Setup Assistance  6     Locomotion (W/C distance) 300 Feet 300 ft   Locomotion (Walk) 5 (Supervision/setup) 5      Locomotion (Walk dist.) 220 Feet (ft) (x 2 sets) 2000 ft   Steps/Stairs Steps/Stairs Ambulated (#): 3 (R HR non-reciprocal pattern SPV)  Level of Assist : 5 (Supervision/setup)  Rail Use: Right  Steps/Stairs Ambulated (#): 3 (R HR non-reciprocal pattern SPV)  Level of Assist : 5 (Supervision/setup)  Rail Use: Right          Nursing:     Neuro:   A&O x_4___                   Respiratory:   [x] WNL   [] O2   [] LPM ______   Other:  Peripheral Vascular:   [] TEDS present   [] Edema present ____ Grade  Cardiac:   [x] WNL   [] Other  Genitourinary:   [x] continent   [] incontinent   [] garner  Abdominal _______ LBM  GI: ___cardiac, diabetic____ Diet ___thin___ Liquids _____ tube feeds  Musculoskeletal: ___ ROM Transfers _____ Assistive Device Used  _mod I_ Level of Assistance  Skin Integumentary:   [x] Intact   [] Not Intact   __________Preventative Measures  Details______________________________________________________________  Pain: [x] Controlled   [] Not Controlled   Pain Meds:   [] Scheduled   [] PRN        Registered Dietitian / Nutrition:   Patient Vitals for the past 100 hrs:   % Diet Eaten   04/13/17 0947 100 %   04/12/17 1856 100 %   04/12/17 1310 100 %   04/12/17 0958 100 %               Supplements:          [] Yes   [] No      Amount of supplement consumed:        Intake/Output Summary (Last 24 hours) at 04/13/17 1243  Last data filed at 04/13/17 0947   Gross per 24 hour   Intake              980 ml   Output                0 ml   Net              980 ml Last bowel movement:         Interdisciplinary Team Goals:     1. Goal  Pt will perform all ADLs at an independent to modified independent level. Barrier  Decreased endurance, Decreased LUE function    Intervention  ADL training, NMRE, Therapeutic activity, Therapeutic exercise     2. Goal  Patient will recall 5 related words in an immediate memory task with 90% accuacy. Barrier  Mild cognitive deficits    Intervention  Cognitive retraining     3. Goal  Patient will demonstrate tandem ambulation 20 ft min a x 1 for balance assist.    Barrier Dynamic standing balance; R LE kinesthesia; eccentric control deficits    Intervention TE; TA     4. Goal  Continued safe ambulation on unit    Barrier      Intervention  Rounding     5. Goal      Barrier      Intervention       6. Goal      Barrier      Intervention         Disposition / Discharge Planning: Follow-up therapy services: Outpatient Physical Therapy, Occupational Therapy, and Speech Therapy    DME recommendations:  TBD   Estimated discharge date:  4/18/2017   Discharge Location:  Private Residence         Electronic Signatures:      Signature Date Signed   Physical Therapist    Sofie Nguyen PT DPT 04/14/2017   Occupational Therapist    Griffin Ashraf, [facility] [address]  4/13/2017   Speech Therapist    Betzaida Hernandez, [id number] [facility]  4/13/17   Recreational Therapist    Fabiola Cisneros, BENJAMÍN 4/13/17   Nursing    Marge Pop RN  4/14/17   Dietitian         Clinical Psychologist         Physician    Anai Ayala. Chely Graves MD  4/13/2017                   The above information has been reviewed with the patient in a language that they can understand. Opportunity for comments and questions has been provided and a signed attestation has been scanned into the \"media tab\" of the EMR.       Patient Signature: ______________________________________________________    Date Signed: __________________________________________________________

## 2017-04-13 NOTE — REHAB NOTE
[facility]    Bedside and Verbal shift change report given to Bibiana Sparks RN (oncoming nurse) by Shreya Cintron RN (offgoing nurse). Report included the following information SBAR, Kardex, MAR and Recent Results.     Situation:   Code Status: Full Code   Reason for Admission: CVA  Hospital Day: 2   Problem List:   Hospital Problems  Date Reviewed: 4/12/2017          Codes Class Noted POA    Vitamin D insufficiency (Chronic) ICD-10-CM: E55.9  ICD-9-CM: 268.9  4/12/2017 Yes    Overview Signed 4/12/2017 10:03 AM by Darwin Lennox, MD     Vitamin D 25-Hydroxy (4/12/2017) = 22.6             Hypertensive heart disease without heart failure (Chronic) ICD-10-CM: I11.9  ICD-9-CM: 402.90  Unknown Yes        * (Principal)Acute ischemic stroke ([facility] ICD-10-CM: I63.9  ICD-9-CM: 434.91  4/6/2017 Yes    Overview Addendum 4/12/2017  5:37 PM by Darwin Lennox, MD     Acute Ischemic Stroke (acute right basal ganglia region infarct) with residual left hemiparesis and mild cognitive-linguistic deficits             Impaired mobility and ADLs ICD-10-CM: Z74.09  ICD-9-CM: 799.89  4/6/2017 Yes        Hemiparesis affecting left side as late effect of cerebrovascular accident (CVA) ([facility] ICD-10-CM: J38.243  ICD-9-CM: 438.20  4/6/2017 Yes        Dyslipidemia with low high density lipoprotein (HDL) cholesterol with hypertriglyceridemia due to type 2 diabetes mellitus (HCC) (Chronic) ICD-10-CM: E11.69, E78.6, E78.1  ICD-9-CM: 250.80, 272.4  4/6/2017 Yes    Overview Signed 4/12/2017 10:10 AM by Darwin Lennox, MD     Lipid profile (4/12/2017): , , HDL 29, LDL 55             New onset type 2 diabetes mellitus ([facility] ICD-10-CM: E11.9  ICD-9-CM: 250.00  4/6/2017 Yes    Overview Signed 4/12/2017  1:00 PM by Darwin Lennox, MD     HbA1c (4/12/2017) = 10.5                   Background:   Past Medical History:   Past Medical History:   Diagnosis Date    Acute ischemic stroke ([facility] 04/06/2017    Acute Ischemic Stroke (acute right basal ganglia region infarct) with residual left hemiparesis and mild cognitive-linguistic deficits    Diastolic dysfunction without heart failure     Dyslipidemia with low high density lipoprotein (HDL) cholesterol with hypertriglyceridemia due to type 2 diabetes mellitus ([facility] 4/6/2017    Lipid profile (4/12/2017): , , HDL 29, LDL 55    Hemiparesis affecting left side as late effect of cerebrovascular accident (CVA) ([facility] 4/6/2017    New onset type 2 diabetes mellitus ([facility] 04/06/2017    HbA1c (4/12/2017) = 10.5    Obesity, Class I, BMI 30-34.9     Vitamin D insufficiency 4/12/2017    Vitamin D 25-Hydroxy (4/12/2017) = 22.6      Patient taking anticoagulants  yes   Patient has a defibrillator: no     Assessment:   Changes in Assessment throughout shift: no     Patient has central line: no Reasons if yes: Insertion date: Last dressing date:   Patient has Pearce Cath: no Reasons if yes:     Insertion date:     Last Vitals:     Vitals:    04/12/17 1147 04/12/17 1634 04/12/17 2030 04/13/17 0007   BP: (!) 162/102 (!) 150/99 (!) 145/94 136/84   Pulse:  (!) 106 98 84   Resp:  18 18    Temp:  98.7 °F (37.1 °C) 98.7 °F (37.1 °C)    SpO2:  99% 99%    Weight:       Height:            PAIN    Pain Assessment    Pain Intensity 1: 0 (04/13/17 0400) Pain Intensity 1: 2 (12/29/14 1105)      Pain Location 1: Abdomen      Pain Intervention(s) 1: Medication (see MAR)  Patient Stated Pain Goal: 0 Patient Stated Pain Goal: 0  o Intervention effective:  yes   o Other actions taken for pain: No complaints of pain     Skin Assessment  Skin color    Condition/Temperature    Integrity    Turgor    Weekly Pressure Ulcer Documentation Weekly Pressure Ulcer Documentation: No Pressure Ulcer Noted-Pressure Ulcer Prevention Initiated  Wound Prevention & Protection Methods  Orientation of wound Orientation of Wound Prevention: Posterior  Location of Prevention Location of Wound Prevention: Sacrum/Coccyx  Dressing Present Dressing Present : No  Dressing Status    Wound Offloading Wound Offloading (Prevention Methods): Bed, pressure redistribution/air     INTAKE/OUPUT    Date 04/12/17 0700 - 04/13/17 0659 04/13/17 0700 - 04/14/17 0659   Shift 8906-6420 8487-0294 24 Hour Total 6133-0759 8278-3949 24 Hour Total   I  N  T  A  K  E   P. O. 940  940         P. O. 940  940       Shift Total  (mL/kg) 940  (10.3)  940  (10.3)      O  U  T  P  U  T   Urine  (mL/kg/hr)            Urine Occurrence(s) 3 x 1 x 4 x       Stool            Stool Occurrence(s) 2 x 0 x 2 x       Shift Total  (mL/kg)           940      Weight (kg) 91.1 91.1 91.1 91.1 91.1 91.1       Recommendations:  1. Patient needs and requests: none at this time    2. Diet: diabetic consistent regular    3. Pending tests/procedures: am labs     4. Functional Level/Equipment: assist x 1/whellchair    5. Estimated Discharge Date: tbd Posted on Whiteboard in Patients Room: no     [facility] Safety Check    A safety check occurred in the patient's room between off going nurse and oncoming nurse listed above. The safety check included the below items  Area Items   H  High Alert Medications - Verify all high alert medication drips (heparin, PCA, etc.)   E  Equipment - Suction is set up for ALL patients (with yanker)  - Red plugs utilized for all equipment (IV pumps, etc.)  - WOWs wiped down at end of shift.  - Room stocked with oxygen, suction, and other unit-specific supplies   A  Alarms - Bed alarm is set for fall risk patients  - Ensure chair alarm is in place and activated if patient is up in a chair   L  Lines - Check IV for any infiltration  - Pearce bag is empty if patient has a Pearce   - Tubing and IV bags are labeled   S  Safety   - Room is clean, patient is clean, and equipment is clean. - Hallways are clear from equipment besides carts.    - Fall bracelet on for fall risk patients  - Ensure room is clear and free of clutter  - Suction is set up for ALL patients (with gonzalez)  - Hallways are clear from equipment besides carts.    - Isolation precautions followed, supplies available outside room, sign posted

## 2017-04-13 NOTE — REHAB NOTE
[facility] PHYSICAL REHABILITATION  [address] [place] [address], [address]     INPATIENT REHABILITATION  OVERALL PLAN OF CARE    Name: Zahida Lou CSN: [id number]   Age: 36 y.o. MRN: [id number]   Sex: male Admit Date: 4/11/2017     Primary Rehabilitation Diagnosis  1. Impaired Mobility and ADLs  2. Acute Ischemic Stroke (acute right basal ganglia region infarct) with residual left hemiparesis and mild cognitive-linguistic deficits      Comorbidities   Hypertensive heart disease without heart failure    Diastolic dysfunction without heart failure    Dyslipidemia with low high density lipoprotein (HDL) cholesterol with hypertriglyceridemia due to type 2 diabetes mellitus    Chronic kidney disease (CKD) stage G2/A1, mildly decreased glomerular filtration rate (GFR) between 60-89 mL/min/1.73 square meter and albuminuria creatinine ratio less than 30 mg/g    Type 2 diabetes mellitus with stage 2 chronic kidney disease     Obesity, Class I, BMI 30-34.9    Vitamin D insufficiency       ANTICIPATED INTERVENTIONS THAT SUPPORT THE MEDICAL NECESSITY OF THIS ADMISSION:    I. Physical Therapy              A. Intensity: 1 hour per day              B. Frequency: 5 times per week              C. Duration: 8 days              D. Goals:    1. Patient will move from supine to sit and sit to supine , scoot up and down and roll side to side in bed with independence. 2. Patient will transfer from bed to chair and chair to bed with independence using the least restrictive device. 3. Patient will perform sit to stand with independence. 4. Patient will ambulate with independence for 250 feet with the least restrictive device. 5. Patient will ascend/descend 12 stairs with 1 handrail(s) with modified independence.    E. Interventions: Interventions may include range of motion (AROM, PROM B LE/trunk), motor function (B LE/trunk strengthening/coordination), activity tolerance (vitals, oxygen saturation levels), bed mobility training, balance activities, gait training (progressive ambulation program), and functional transfer training. II. Occupational Therapy  21 . Intensity: 1 hour per day              B. Frequency: 5 times per week              C. Duration: 1 week              D. Goals:    1. Pt will perform self-feeding with independence. 2. Pt will perform grooming with independence. 3. Pt will perform UB bathing with modified independence. 4. Pt will perform LB bathing with modified independence. 5. Pt will perform tub/shower transfer with modified independence. 6. Pt will perform UB dressing with independence. 7. Pt will perform LB dressing with modified independence. 8. Pt will perform toileting task with modified independence. 9. Pt will perform toilet transfer with modified independence. E. Interventions: Interventions may include range of motion (AROM, PROM B UE), motor function (B UE/ strengthening/coordination), activity tolerance (vitals, oxygen saturation levels), balance training, ADL/IADL training and functional transfer training. III. Speech Therapy              A. Intensity: 1-2 times per day              B. Frequency: 4-7 times per week              C. Duration: 2 weeks              D. Long Term Goals:    1. Patient will recall short lists of up to 5 related items with 90% accuracy. 2. Patient will perform varied functional problem solving tasks (higher level) with 90% accuracy. 3. Patient will read paragraphs and respond to content questions with % accuracy. 4. Patient will discuss commonly heard figurative language (idioms) with 90% accuracy. E. Interventions: SLP will follow daily to address cognition and reinforce strategies taught by OT and PT.      PHYSICIAN'S ASSESSMENT OF FINDINGS:    Are the established goals sufficient for achieving the optimal level of function?     [x]  Yes      []  No    What changes would you recommend to the goals as written? None      Are the interventions noted sufficient for achieving the optimal level of function? [x]  Yes      []  No    What changes would you recommend to the interventions noted? If therapy staff is unable to provide 3 hr of total therapy per day in 5 days due to medical issues or decreased patient tolerance, may modify treatment schedule to 15 hr/week.       Estimated length of stay: 1 week      Medical rehabilitation prognosis:    []  Excellent     [x]  Good     []  Fair     []  Guarded       Discharge Destination:     [x]  Home     []  [address]     []  Emigdio Rupesh     []  Jacob Weiss     []  [place]     []  Other:       Signed:    James Drummond MD    April 13, 2017

## 2017-04-13 NOTE — PROGRESS NOTES
Problem: Self Care Deficits Care Plan (Adult)  Goal: *Acute Goals and Plan of Care (Insert Text)  Short Term Goals = Long Term Goals (to be met upon discharge date) in order to increase pts functional independence and safety, and decrease burden of care:  1. Pt will perform self-feeding with independence. 2. Pt will perform grooming with independence. 3. Pt will perform UB bathing with modified independence. 4. Pt will perform LB bathing with modified independence. 5. Pt will perform tub/shower transfer with modified independence. 6. Pt will perform UB dressing with independence. 7. Pt will perform LB dressing with modified independence. 8. Pt will perform toileting task with modified independence. 9. Pt will perform toilet transfer with modified independence. OCCUPATIONAL THERAPY DAILY NOTE  Patient Name:Juan Larson  Time In: 930  Time Out: 56     Medical Diagnosis:  CVA  Acute ischemic stroke ([facility] Acute ischemic stroke ([facility]      Pain at start of tx: 0/10  Pain at stop of tx: 0/10     Patient identified with name and : Yes  Subjective: Pt asks, \"Is it always gonna be this awkward? \" when participating in FM coordination tasks using L hand. Objective:      THERAPEUTIC ACTIVITY Daily Assessment     Pt performed B FM coordination task requiring pt to obtain small beads from container utilizing L pincer grasp and threading them onto shoestring with only minor difficulty noted at times with picking up beads. He also completed copying moderately complex pegboard pattern utilizing 1/2\" pegs. Pt required increased time and effort for orienting pegs prior to placing them into the board. He was able to copy the pattern without any difficulty noted. THERAPEUTIC EXERCISE Daily Assessment     Pt performed finger hook, full , finger extension with index finger, and finger spread x10-15 reps each utilizing yellow theraputty.  Pt provided with demo handout for these and additional exercises to complete as HEP. Assessment: Pt demonstrates good motivation and attention to working on FM tasks to improve L hand strength and coordination. He is able to use his L hand for functional tasks, however requires increased time, effort, and focus to do so. Plan of Care: Continue POC to maximize pt independence and safety performing ADLs and functional transfers/mobility.      Donna Giordano, OTR  4/13/2017

## 2017-04-13 NOTE — PROGRESS NOTES
[facility] PHYSICAL REHABILITATION  [address] [place] [address], [address]     INPATIENT REHABILITATION  DAILY PROGRESS NOTE     Date: 4/13/2017    Name: Siria Perez Age / Sex: 36 y.o. / male   CSN: [id number] MRN: [id number]   [facility] Date: 4/11/2017 Length of Stay: 2 days     Primary Rehab Diagnosis: Impaired Mobility and ADLs secondary to Acute Ischemic Stroke (acute right basal ganglia region infarct) with residual left hemiparesis and mild cognitive-linguistic deficits      Subjective:     Patient seen and examined. Patient's Complaint:   No significant medical complaints    Pain Control: no current joint or muscle symptoms, essentially pain-free      Objective:     Vital Signs:  Patient Vitals for the past 24 hrs:   BP Temp Pulse Resp SpO2   04/13/17 0813 132/86 98.4 °F (36.9 °C) 82 18 96 %   04/13/17 0621 132/78 - 82 - -   04/13/17 0007 136/84 - 84 - -   04/12/17 2030 (!) 145/94 98.7 °F (37.1 °C) 98 18 99 %   04/12/17 1634 (!) 150/99 98.7 °F (37.1 °C) (!) 106 18 99 %        Physical Exam:  GENERAL SURVEY: Patient is awake, alert, oriented x 3, sitting comfortably on the chair, not in acute respiratory distress. HEENT: pink palpebral conjunctivae, anicteric sclerae, no nasoaural discharge, moist oral mucosa  NECK: supple, no jugular venous distention, no palpable lymph nodes  CHEST/LUNGS: symmetrical chest expansion, good air entry, clear breath sounds  HEART: adynamic precordium, good S1 S2, no S3, regular rhythm, no murmurs  ABDOMEN: flat, bowel sounds appreciated, soft, non-tender  EXTREMITIES: pink nailbeds, no edema, full and equal pulses, no calf tenderness   NEUROLOGICAL EXAM: The patient is awake, alert and oriented x3, able to answer questions fairly appropriately, able to follow 1 and 2 step commands. Able to tell time from the wall clock. Cranial nerves II-XII are grossly intact. No gross sensory deficit.  Motor strength is 5/5 on the RUE and RLE, 4/5 on the LUE and LLE. (+) mild cognitive-linguistic deficits.       Current Medications:  Current Facility-Administered Medications   Medication Dose Route Frequency    cholecalciferol (VITAMIN D3) tablet 2,000 Units  2,000 Units Oral DAILY    insulin detemir (LEVEMIR) injection 15 Units  15 Units SubCUTAneous QHS    lisinopril (PRINIVIL, ZESTRIL) tablet 40 mg  40 mg Oral DAILY    omega-3 acid ethyl esters (LOVAZA) capsule 1,000 mg  1 g Oral BID WITH MEALS    hydrALAZINE (APRESOLINE) tablet 25 mg  25 mg Oral TID PRN    glipiZIDE (GLUCOTROL) tablet 5 mg  5 mg Oral ACB    labetalol (NORMODYNE) tablet 100 mg  100 mg Oral Q12H    acetaminophen (TYLENOL) tablet 650 mg  650 mg Oral Q4H PRN    bisacodyl (DULCOLAX) tablet 10 mg  10 mg Oral Q48H PRN    heparin (porcine) injection 5,000 Units  5,000 Units SubCUTAneous Q8H    insulin lispro (HUMALOG) injection   SubCUTAneous TIDAC    aspirin (ASPIRIN) tablet 325 mg  325 mg Oral DAILY WITH BREAKFAST    atorvastatin (LIPITOR) tablet 40 mg  40 mg Oral QHS    amLODIPine (NORVASC) tablet 10 mg  10 mg Oral DAILY    metFORMIN (GLUCOPHAGE) tablet 500 mg  500 mg Oral BID WITH MEALS       Allergies:  No Known Allergies      Functional Progress:    SPEECH AND LANGUAGE PATHOLOGY    ON ADMISSION MOST RECENT   Comprehension (Native Language)  Primary Mode of Comprehension: Auditory  Score: 6 Comprehension (Native Language)  Primary Mode of Comprehension: Auditory  Score: 6     Expression (Native Language)  Primary Mode of Expression: Verbal  Score: 6   Expression (Native Language)  Primary Mode of Expression: Verbal  Score: 6     Social Interaction/Pragmatics  Score: 6 Social Interaction/Pragmatics  Score: 6     Problem Solving  Score: 6   Problem Solving  Score: 5     Memory  Score: 6 Memory  Score: 4       Legend:   7 - Independent   6 - Modified Independent   5 - Standby Assistance / Supervision / Set-up   4 - Minimum Assistance / Contact Guard Assistance   3 - Moderate Assistance   2 - Maximum Assistance   1 - Total Assistance / Dependent       Lab/Data Review:  Recent Results (from the past 24 hour(s))   GLUCOSE, POC    Collection Time: 04/12/17  4:51 PM   Result Value Ref Range    Glucose (POC) 232 (H) 70 - 110 mg/dL   MICROALBUMIN, UR, RAND W/ MICROALBUMIN/CREA RATIO    Collection Time: 04/12/17  5:29 PM   Result Value Ref Range    Microalbumin,urine random 1.57 0 - 3.0 MG/DL    Creatinine, urine 162.00 (H) 30 - 125 mg/dL    Microalbumin/Creat ratio (mg/g creat) 10 0 - 30 mg/g   GLUCOSE, POC    Collection Time: 04/12/17  9:36 PM   Result Value Ref Range    Glucose (POC) 202 (H) 70 - 110 mg/dL   HGB & HCT    Collection Time: 04/13/17  6:27 AM   Result Value Ref Range    HGB 13.8 13.0 - 16.0 g/dL    HCT 40.4 36.0 - 48.0 %   PLATELET    Collection Time: 04/13/17  6:27 AM   Result Value Ref Range    PLATELET 860 845 - 534 K/uL   GLUCOSE, POC    Collection Time: 04/13/17  8:31 AM   Result Value Ref Range    Glucose (POC) 157 (H) 70 - 110 mg/dL   GLUCOSE, POC    Collection Time: 04/13/17 11:48 AM   Result Value Ref Range    Glucose (POC) 212 (H) 70 - 110 mg/dL       Estimated Glomerular Filtration Rate:  On admission, based on a Creatinine of 1.18 mg/dl:   Estimated GFR using CKD-EPI = 75.1 mL/min/1.73m2   Estimated GFR using MDRD = 71.6 mL/min/1.73m2      Assessment:     Primary Rehabilitation Diagnosis  1. Impaired Mobility and ADLs  2.  Acute Ischemic Stroke (acute right basal ganglia region infarct) with residual left hemiparesis and mild cognitive-linguistic deficits     Comorbidities   Hypertensive heart disease without heart failure    Diastolic dysfunction without heart failure    Dyslipidemia with low high density lipoprotein (HDL) cholesterol with hypertriglyceridemia due to type 2 diabetes mellitus    Chronic kidney disease (CKD) stage G2/A1, mildly decreased glomerular filtration rate (GFR) between 60-89 mL/min/1.73 square meter and albuminuria creatinine ratio less than 30 mg/g    Type 2 diabetes mellitus with stage 2 chronic kidney disease     Obesity, Class I, BMI 30-34.9    Vitamin D insufficiency       Plan:     1. Justification for continued stay: Good progression towards established rehabilitation goals. 2. Medical Issues being followed closely:    [x]  Fall and safety precautions     []  Wound Care     [x]  Bowel and Bladder Function     [x]  Fluid Electrolyte and Nutrition Balance     []  Pain Control      3. Issues that 24 hour rehabilitation nursing is following:    [x]  Fall and safety precautions     []  Wound Care     [x]  Bowel and Bladder Function     [x]  Fluid Electrolyte and Nutrition Balance     []  Pain Control      [x]  Assistance with and education on in-room safety with transfers to and from the bed, wheelchair, toilet and shower. 4. Acute rehabilitation plan of care:    [x]  Continue current care and rehab. [x]  Physical Therapy           [x]  Occupational Therapy           [x]  Speech Therapy     []  Hold Rehab until further notice     5. Medications:    [x]  MAR Reviewed     [x]  Continue Present Medications     6. DVT Prophylaxis:      []  Lovenox     [x]  Unfractionated Heparin     []  Coumadin     []  Xarelto     [x]  MARY Stockings     []  Sequential Compression Device     []  None     7.  Orders:   > Acute Ischemic Stroke (acute right basal ganglia region infarct) with residual left hemiparesis and mild cognitive-linguistic deficits   > Continue:    > Aspirin 325 mg PO once daily with breakfast    > Atorvastatin 40 mg PO q HS     > Dyslipidemia with low high density lipoprotein (HDL) cholesterol with hypertriglyceridemia   > Patient was started on Atorvastatin 40 mg PO q HS at [facility]    > Lipid profile (4/12/2017): , , HDL 29, LDL 55   > On 4/12/2017, added Lovaza 1 gram PO BID   > Continue:    > Atorvastatin 40 mg PO q HS     > Lovaza 1 gram PO BID    > Hypertensive heart disease without heart failure   > Upon admission to the ARU:    > Discontinued Hydrochlorothiazide 25 mg PO once daily    > Increased Amlodipine from 5 mg to 10 mg PO once daily (6AM)    > Increased Lisinopril from 20 mg to 40 mg PO once daily (9AM)    > Added Hydralazine 25 mg PO TID PRN for SBP greater than 150 mmHg or DBP greater than 100 mmHg   > On 4/12/2017, added Labetalol 100 mg PO q 12 hr (9AM, 9PM)   > Continue:    > Amlodipine 10 mg PO once daily (6AM)    > Lisinopril 40 mg PO once daily (9AM)    > Labetalol 100 mg PO q 12 hr (9AM, 9PM)      > Hydralazine 25 mg PO TID PRN for SBP greater than 150 mmHg or DBP greater than 100 mmHg     > Type 2 diabetes mellitus, newly diagnosed, with chronic kidney disease stage G2/A1   > HbA1c (4/12/2017) = 10.5   > Upon admission to the ARU:    > Added Metformin 500 mg PO BID with meals    > Decreased Levemir from 18 units to 15 units SC q HS   > Microalbumin/Creatinine ratio(4/12/2017) = 10 mg/g   > Add Glipizide 5 mg PO once daily before breakfast   > Continue:    > Metformin 500 mg PO BID with meals    > Levemir 15 units SC q HS    > Humalog insulin sliding scale SC TID AC only     > Vitamin D Insufficiency   > Vitamin D 25-Hydroxy (4/12/2017) = 22.6   > Patient was given Cholecalciferol 50,000 units PO x 1 dose today   > Cholecalciferol 2,000 units PO once daily        8. Patient's progress in rehabilitation and medical issues discussed with the patient. All questions answered to the best of my ability. Care plan discussed with patient, mother and nurse.       Signed:    Vick Sahu MD    April 13, 2017

## 2017-04-13 NOTE — PROGRESS NOTES
When  attempted to visit patient it was not a good time to visit. Patient was out of his room in therapy.  left spiritual care materials at bedside. Chaplains will provide spiritual care as needed, as requested. Salinas Webb MDiv.   Board Certified [employer] [phone]

## 2017-04-13 NOTE — PROGRESS NOTES
Problem: Mobility Impaired (Adult and Pediatric)  Goal: *Acute Goals and Plan of Care (Insert Text)  Physical Therapy Goals  Initiated 2017 and to be accomplished within 8 day(s)  1. Patient will move from supine to sit and sit to supine , scoot up and down and roll side to side in bed with independence. 2. Patient will transfer from bed to chair and chair to bed with independence using the least restrictive device. 3. Patient will perform sit to stand with independence. 4. Patient will ambulate with independence for 250 feet with the least restrictive device. 5. Patient will ascend/descend 12 stairs with 1 handrail(s) with modified independence. PHYSICAL THERAPY DAILY NOTE  Patient Name:Juan Ann  Time In: 1330  Time Out: 0755  Patient Seen For: Gait training; Therapeutic exercise;Balance activities  Diagnosis: CVA  Acute ischemic stroke ([facility] Acute ischemic stroke ([facility] [facility]  Precautions: fall risk     Subjective: Pt reports \"Its my hand that is the biggest problem. \"     Pain at start of tx: no c/o   Pain at stop of tx: no c/o      Patient identified with name and : yes          Objective:       TRANSFERS Daily Assessment     Transfer Type: Other  Other: stand step txfr  Transfer Assistance : 6 (Modified independent)  Sit to Stand Assistance: Modified independent   Pt performed all txfrs Varsha. GAIT Daily Assessment     Amount of Assistance: 5 (Supervision/setup)  Distance (ft): 2000 Feet (ft) ([id number])  Assistive Device:  (no AD)   Pt ambulated initially 800ft to include on unit and outside to determine pt's weakness and challenges. Pt ambulated 800ft while performing random head turns and turn arounds to either side to challenge balance, all with dist S and no LOB. Pt ambulated outside and through unit, ~2000ft, to include up and down curbs and uneven pavements with dist S and no LOB.            BALANCE Daily Assessment     Sitting - Static: Good (unsupported)  Sitting - Dynamic: Good (unsupported)  Standing - Static: Good  Standing - Dynamic : Intact   Pt performed side stepping with BTB resistance, side stepping with squats, cross stepping and braiding stepping all to challenge balance, strength and coordination to determine pt's weakness and challenges. Pt performed tandem gait fwd and bkwd on straight line with increased challenge noted with LLE wt bearing. Pt performed LLE SLS for RLE tap ups on 6\" step and on bosu ball with good balance. Pt performed balance challenges on balance board fwd/bkwd and side to side, bosu ball static standing and with mini squats and inverted bosu ball static standing, all with slight decreased LLE strength noted and minimal use of BUE on //bars for balance. Pt participated in [address] [place] [address] with pt required to  tandem stance with LLE posterior for increased wt bearing. Pt unable to maintain tandem position for more than 2-3min at a time and reported most challenge and fatigue from this activity. Assessment: Pt demo'd high level mobility and required intense high level tasks in order to challenge minimal deficits with pt's LLE. Plan of Care: Challenge eccentric control with LLE, quadruped and tall kneeling activities.       Edwige Gupta, PTA  4/13/2017

## 2017-04-14 LAB
GLUCOSE BLD STRIP.AUTO-MCNC: 100 MG/DL (ref 70–110)
GLUCOSE BLD STRIP.AUTO-MCNC: 136 MG/DL (ref 70–110)
GLUCOSE BLD STRIP.AUTO-MCNC: 140 MG/DL (ref 70–110)
GLUCOSE BLD STRIP.AUTO-MCNC: 93 MG/DL (ref 70–110)

## 2017-04-14 PROCEDURE — 97530 THERAPEUTIC ACTIVITIES: CPT

## 2017-04-14 PROCEDURE — 65310000000 HC RM PRIVATE REHAB

## 2017-04-14 PROCEDURE — 74011250636 HC RX REV CODE- 250/636: Performed by: INTERNAL MEDICINE

## 2017-04-14 PROCEDURE — 97110 THERAPEUTIC EXERCISES: CPT

## 2017-04-14 PROCEDURE — 82962 GLUCOSE BLOOD TEST: CPT

## 2017-04-14 PROCEDURE — 97535 SELF CARE MNGMENT TRAINING: CPT

## 2017-04-14 PROCEDURE — 92507 TX SP LANG VOICE COMM INDIV: CPT

## 2017-04-14 PROCEDURE — 74011250637 HC RX REV CODE- 250/637: Performed by: INTERNAL MEDICINE

## 2017-04-14 PROCEDURE — 97116 GAIT TRAINING THERAPY: CPT

## 2017-04-14 RX ADMIN — METFORMIN HYDROCHLORIDE 500 MG: 500 TABLET, FILM COATED ORAL at 08:20

## 2017-04-14 RX ADMIN — HEPARIN SODIUM 5000 UNITS: 5000 INJECTION, SOLUTION INTRAVENOUS; SUBCUTANEOUS at 22:00

## 2017-04-14 RX ADMIN — ATORVASTATIN CALCIUM 40 MG: 40 TABLET, FILM COATED ORAL at 20:26

## 2017-04-14 RX ADMIN — GLIPIZIDE 5 MG: 5 TABLET ORAL at 08:10

## 2017-04-14 RX ADMIN — HEPARIN SODIUM 5000 UNITS: 5000 INJECTION, SOLUTION INTRAVENOUS; SUBCUTANEOUS at 06:42

## 2017-04-14 RX ADMIN — HEPARIN SODIUM 5000 UNITS: 5000 INJECTION, SOLUTION INTRAVENOUS; SUBCUTANEOUS at 14:26

## 2017-04-14 RX ADMIN — OMEGA-3-ACID ETHYL ESTERS 1000 MG: 900 CAPSULE, LIQUID FILLED ORAL at 18:18

## 2017-04-14 RX ADMIN — AMLODIPINE BESYLATE 10 MG: 5 TABLET ORAL at 06:42

## 2017-04-14 RX ADMIN — VITAMIN D, TAB 1000IU (100/BT) 2000 UNITS: 25 TAB at 08:11

## 2017-04-14 RX ADMIN — LABETALOL HYDROCHLORIDE 100 MG: 100 TABLET, FILM COATED ORAL at 20:26

## 2017-04-14 RX ADMIN — OMEGA-3-ACID ETHYL ESTERS 1000 MG: 900 CAPSULE, LIQUID FILLED ORAL at 08:20

## 2017-04-14 RX ADMIN — LISINOPRIL 40 MG: 20 TABLET ORAL at 08:11

## 2017-04-14 RX ADMIN — ASPIRIN 325 MG ORAL TABLET 325 MG: 325 PILL ORAL at 08:20

## 2017-04-14 RX ADMIN — LABETALOL HYDROCHLORIDE 100 MG: 100 TABLET, FILM COATED ORAL at 08:10

## 2017-04-14 RX ADMIN — METFORMIN HYDROCHLORIDE 500 MG: 500 TABLET, FILM COATED ORAL at 18:18

## 2017-04-14 NOTE — PROGRESS NOTES
Problem: Neurolinguistics Impaired (Adult)  Goal: *Speech Goal: (INSERT TEXT)  Long term goals:  1. Patient will recall short lists of up to 5 related items with 90% accuracy. 2. Patient will perform varied functional problem solving tasks (higher level) with 90% accuracy. 3. Patient will read paragraphs and respond to content questions with % accuracy. 4. Patient will discuss commonly heard figurative language (idioms) with 90% accuracy. Short term goals (by 4/19/17):  1. Patient will recall short lists of up to 5 related items with 75-85%% accuracy. 2. Patient will perform varied functional problem solving tasks (higher level) with 75-85% accuracy. 3. Patient will read paragraphs and respond to content questions with 80-90% accuracy. 4. Patient will discuss commonly heard figurative language (idioms) with 75-85% accuracy. SPEECH LANGUAGE PATHOLOGY TREATMENT     Patient: Jody Lund (15 y.o. male)  Date: 4/14/2017  Diagnosis: CVA  Acute ischemic stroke [facility] Acute ischemic stroke [facility])           SUBJECTIVE:   Patient stated [place] lived in [place] OBJECTIVE:   Mental Status:  Mr. Lio Cisneros was awake and alert during the scheduled sessions this morning. He was easily engaged in treatment tasks presented. Treatment & Interventions:   Mr. Lio Cisneros was seen in the weekly CVA support group today. Patient was an active participant in the group discussion. The conversation focused upon recognizing stroke symptoms using the acronym BE FAST. Also discussed was recovery from a CVA and lifestyle changes which the group participants are experiencing and will have to think about in the future.   Neuro-Linguistics:   Orientation:                                                     Supervision  Recent memory:                                             Supervision  Responding to questions from paragraphs:   93% accuracy  ID consequences of changes:                        100% accuracy Response & Tolerance to Activities:  Mr. Delmy Eckert was engaged and interested in the education this morning. He was an active participant in the group dynamic. Patient also with good participation in 1:1 session immediately following the group. Pain:  Pain Scale 1: Numeric (0 - 10)     After treatment:   [ ]       Patient left in no apparent distress sitting up in chair  [ ]       Patient left in no apparent distress in bed  [ ]       Call bell left within reach  [X]       Nursing notified  [ ]       Caregiver present  [ ]       Bed alarm activated      ASSESSMENT:   Mr. Delmy Eckert is making steady progress in Speech Therapy. Discharge is scheduled for Monday, April 17th following therapy. Progression toward goals:  [X]       Improving appropriately and progressing toward goals  [ ]       Improving slowly and progressing toward goals  [ ]       Not making progress toward goals and plan of care will be adjusted      PLAN:   Patient continues to benefit from skilled intervention to address the above impairments. Continue treatment per established plan of care.   Discharge Recommendations:  Outpatient     Deb Police, SLP  Time Calculation: 60 minutes

## 2017-04-14 NOTE — PROGRESS NOTES
participated in interdisciplinary rounds and conducted a Spiritual Assessment for Marci Olea, who is a 36 y.o.,male. The  provided the following Interventions:  Continued the relationship of care and support. Listened empathically. Offered prayer and assurance of continued prayer on patients behalf. Chart reviewed. The following outcomes were achieved:  Patient expressed gratitude for 's visit. Plan:  Chaplains will continue to follow and will provide pastoral care on an as needed/requested basis.  recommends bedside caregivers page  on duty if patient shows signs of acute spiritual or emotional distress.        Maribel Galvez  [address] [place] [address]  [phone]

## 2017-04-14 NOTE — REHAB NOTE
SHIFT CHANGE NOTE FOR [facility]    Bedside and Verbal shift change report given to Rody/María Flower RN (oncoming nurse) by Wilma Reveles, YULIANA (offgoing nurse). Report included the following information SBAR, Kardex, MAR and Recent Results.     Situation:   Code Status: Full Code   Reason for Admission: CVA  Hospital Day: 3   Problem List:   Hospital Problems  Date Reviewed: 4/13/2017          Codes Class Noted POA    Vitamin D insufficiency (Chronic) ICD-10-CM: E55.9  ICD-9-CM: 268.9  4/12/2017 Yes    Overview Signed 4/12/2017 10:03 AM by Corky Leach MD     Vitamin D 25-Hydroxy (4/12/2017) = 22.6             Chronic kidney disease (CKD) stage G2/A1, mildly decreased glomerular filtration rate (GFR) between 60-89 mL/min/1.73 square meter and albuminuria creatinine ratio less than 30 mg/g (Chronic) ICD-10-CM: N18.2  ICD-9-CM: 585.2  4/12/2017 Yes        Hypertensive heart disease without heart failure (Chronic) ICD-10-CM: I11.9  ICD-9-CM: 402.90  Unknown Yes        * (Principal)Acute ischemic stroke ([facility] [address] [facility] ICD-10-CM: I63.9  ICD-9-CM: 434.91  4/6/2017 Yes    Overview Addendum 4/12/2017  5:37 PM by Corky Leach MD     Acute Ischemic Stroke (acute right basal ganglia region infarct) with residual left hemiparesis and mild cognitive-linguistic deficits             Impaired mobility and ADLs ICD-10-CM: Z74.09  ICD-9-CM: 799.89  4/6/2017 Yes        Hemiparesis affecting left side as late effect of cerebrovascular accident (CVA) ([facility] [address] ICD-10-CM: A17.232  ICD-9-CM: 438.20  4/6/2017 Yes        Dyslipidemia with low high density lipoprotein (HDL) cholesterol with hypertriglyceridemia due to type 2 diabetes mellitus (HCC) (Chronic) ICD-10-CM: E11.69, E78.6, E78.1  ICD-9-CM: 250.80, 272.4  4/6/2017 Yes    Overview Signed 4/12/2017 10:10 AM by Corky Leach MD     Lipid profile (4/12/2017): , , HDL 29, LDL 55             Type 2 diabetes mellitus with stage 2 chronic kidney disease ([facility] ICD-10-CM: E11.22, N18.2  ICD-9-CM: 250.40, 585.2  4/6/2017 Yes    Overview Signed 4/13/2017  1:51 PM by Jl Zavaleta MD     HbA1c (4/12/2017) = 10.5                   Background:   Past Medical History:   Past Medical History:   Diagnosis Date    Acute ischemic stroke ([facility] 04/06/2017    Acute Ischemic Stroke (acute right basal ganglia region infarct) with residual left hemiparesis and mild cognitive-linguistic deficits    Chronic kidney disease (CKD) stage G2/A1, mildly decreased glomerular filtration rate (GFR) between 60-89 mL/min/1.73 square meter and albuminuria creatinine ratio less than 30 mg/g 3/82/7574    Diastolic dysfunction without heart failure     Dyslipidemia with low high density lipoprotein (HDL) cholesterol with hypertriglyceridemia due to type 2 diabetes mellitus ([facility] 4/6/2017    Lipid profile (4/12/2017): , , HDL 29, LDL 55    Hemiparesis affecting left side as late effect of cerebrovascular accident (CVA) ([facility] 4/6/2017    Obesity, Class I, BMI 30-34.9     Type 2 diabetes mellitus with stage 2 chronic kidney disease ([facility] 04/06/2017    HbA1c (4/12/2017) = 10.5    Vitamin D insufficiency 4/12/2017    Vitamin D 25-Hydroxy (4/12/2017) = 22.6      Patient taking anticoagulants  yes   Patient has a defibrillator: no     Assessment:   Changes in Assessment throughout shift: no     Patient has central line: no Reasons if yes: Insertion date: Last dressing date:   Patient has Pearce Cath: no Reasons if yes:     Insertion date:     Last Vitals:     Vitals:    04/13/17 1602 04/13/17 2134 04/14/17 0611 04/14/17 0724   BP: 135/85 143/89 121/80 126/78   Pulse: 94 88 68 76   Resp: 18 20  20   Temp: 97.6 °F (36.4 °C) 98.7 °F (37.1 °C)  98.2 °F (36.8 °C)   SpO2: 98% 97%  95%   Weight:       Height:            PAIN    Pain Assessment    Pain Intensity 1: 0 (04/14/17 0744) Pain Intensity 1: 2 (12/29/14 1105)      Pain Location 1: Abdomen      Pain Intervention(s) 1: Medication (see MAR)  Patient Stated Pain Goal: 0 Patient Stated Pain Goal: 0  o Intervention effective:  yes   o Other actions taken for pain: No complaints of pain     Skin Assessment  Skin color    Condition/Temperature    Integrity    Turgor    Weekly Pressure Ulcer Documentation Weekly Pressure Ulcer Documentation: No Pressure Ulcer Noted-Pressure Ulcer Prevention Initiated  Wound Prevention & Protection Methods  Orientation of wound Orientation of Wound Prevention: Posterior  Location of Prevention Location of Wound Prevention: Sacrum/Coccyx  Dressing Present Dressing Present : No  Dressing Status    Wound Offloading Wound Offloading (Prevention Methods): Bed, pressure redistribution/air     INTAKE/OUPUT    Date 04/13/17 0700 - 04/14/17 0659 04/14/17 0700 - 04/15/17 0659   Shift 2826-5626 8507-4281 24 Hour Total 0700-1859 1900-0659 24 Hour Total   I  N  T  A  K  E   P.O. 620  620         P. O. 620  620       Shift Total  (mL/kg) 620  (6.8)  620  (6.8)      O  U  T  P  U  T   Urine  (mL/kg/hr)            Urine Occurrence(s) 4 x 0 x 4 x 0 x  0 x    Stool            Stool Occurrence(s) 1 x 0 x 1 x 0 x  0 x    Shift Total  (mL/kg)           620      Weight (kg) 91.1 91.1 91.1 91.1 91.1 91.1       Recommendations:  1. Patient needs and requests: none at this time    2. Diet: diabetic consistent regular    3. Pending tests/procedures: am labs     4. Functional Level/Equipment: assist x 1/whellchair    5. Estimated Discharge Date: tbd Posted on Whiteboard in Patients Room: no     [facility] Safety Check    A safety check occurred in the patient's room between off going nurse and oncoming nurse listed above.     The safety check included the below items  Area Items   H  High Alert Medications - Verify all high alert medication drips (heparin, PCA, etc.)   E  Equipment - Suction is set up for ALL patients (with yanker)  - Red plugs utilized for all equipment (IV pumps, etc.)  - WOWs wiped down at end of shift.  - Room stocked with oxygen, suction, and other unit-specific supplies   A  Alarms - Bed alarm is set for fall risk patients  - Ensure chair alarm is in place and activated if patient is up in a chair   L  Lines - Check IV for any infiltration  - Pearce bag is empty if patient has a Pearce   - Tubing and IV bags are labeled   S  Safety   - Room is clean, patient is clean, and equipment is clean. - Hallways are clear from equipment besides carts. - Fall bracelet on for fall risk patients  - Ensure room is clear and free of clutter  - Suction is set up for ALL patients (with gonzalez)  - Hallways are clear from equipment besides carts.    - Isolation precautions followed, supplies available outside room, sign posted

## 2017-04-14 NOTE — PROGRESS NOTES
[facility] PHYSICAL REHABILITATION  [address] [place] [address], [address]     INPATIENT REHABILITATION  DAILY PROGRESS NOTE     Date: 4/14/2017    Name: Milan Alarcon Age / Sex: 36 y.o. / male   CSN: [id number] MRN: [id number]   [facility] Date: 4/11/2017 Length of Stay: 3 days     Primary Rehab Diagnosis: Impaired Mobility and ADLs secondary to Acute Ischemic Stroke (acute right basal ganglia region infarct) with residual left hemiparesis and mild cognitive-linguistic deficits      Subjective:     Patient seen and examined. Blood pressure controlled. Blood sugars controlled. Team conference was held at bedside this AM.     Patient's Complaint:   No significant medical complaints    Pain Control: no current joint or muscle symptoms, essentially pain-free      Objective:     Vital Signs:  Patient Vitals for the past 24 hrs:   BP Temp Pulse Resp SpO2   04/14/17 0724 126/78 98.2 °F (36.8 °C) 76 20 95 %   04/14/17 0611 121/80 - 68 - -   04/13/17 2134 143/89 98.7 °F (37.1 °C) 88 20 97 %   04/13/17 1602 135/85 97.6 °F (36.4 °C) 94 18 98 %        Physical Exam:  GENERAL SURVEY: Patient is awake, alert, oriented x 3, sitting comfortably on the chair, not in acute respiratory distress. HEENT: pink palpebral conjunctivae, anicteric sclerae, no nasoaural discharge, moist oral mucosa  NECK: supple, no jugular venous distention, no palpable lymph nodes  CHEST/LUNGS: symmetrical chest expansion, good air entry, clear breath sounds  HEART: adynamic precordium, good S1 S2, no S3, regular rhythm, no murmurs  ABDOMEN: flat, bowel sounds appreciated, soft, non-tender  EXTREMITIES: pink nailbeds, no edema, full and equal pulses, no calf tenderness   NEUROLOGICAL EXAM: The patient is awake, alert and oriented x3, able to answer questions fairly appropriately, able to follow 1 and 2 step commands. Able to tell time from the wall clock. Cranial nerves II-XII are grossly intact. No gross sensory deficit.  Motor strength is 5/5 on the RUE and RLE, 4/5 on the LUE and LLE. (+) mild cognitive-linguistic deficits.       Current Medications:  Current Facility-Administered Medications   Medication Dose Route Frequency    cholecalciferol (VITAMIN D3) tablet 2,000 Units  2,000 Units Oral DAILY    insulin detemir (LEVEMIR) injection 15 Units  15 Units SubCUTAneous QHS    lisinopril (PRINIVIL, ZESTRIL) tablet 40 mg  40 mg Oral DAILY    omega-3 acid ethyl esters (LOVAZA) capsule 1,000 mg  1 g Oral BID WITH MEALS    hydrALAZINE (APRESOLINE) tablet 25 mg  25 mg Oral TID PRN    glipiZIDE (GLUCOTROL) tablet 5 mg  5 mg Oral ACB    labetalol (NORMODYNE) tablet 100 mg  100 mg Oral Q12H    acetaminophen (TYLENOL) tablet 650 mg  650 mg Oral Q4H PRN    bisacodyl (DULCOLAX) tablet 10 mg  10 mg Oral Q48H PRN    heparin (porcine) injection 5,000 Units  5,000 Units SubCUTAneous Q8H    insulin lispro (HUMALOG) injection   SubCUTAneous TIDAC    aspirin (ASPIRIN) tablet 325 mg  325 mg Oral DAILY WITH BREAKFAST    atorvastatin (LIPITOR) tablet 40 mg  40 mg Oral QHS    amLODIPine (NORVASC) tablet 10 mg  10 mg Oral DAILY    metFORMIN (GLUCOPHAGE) tablet 500 mg  500 mg Oral BID WITH MEALS       Allergies:  No Known Allergies      Lab/Data Review:  Recent Results (from the past 24 hour(s))   GLUCOSE, POC    Collection Time: 04/13/17  4:53 PM   Result Value Ref Range    Glucose (POC) 101 70 - 110 mg/dL   GLUCOSE, POC    Collection Time: 04/13/17  9:10 PM   Result Value Ref Range    Glucose (POC) 148 (H) 70 - 110 mg/dL   GLUCOSE, POC    Collection Time: 04/14/17  7:19 AM   Result Value Ref Range    Glucose (POC) 140 (H) 70 - 110 mg/dL   GLUCOSE, POC    Collection Time: 04/14/17 12:31 PM   Result Value Ref Range    Glucose (POC) 100 70 - 110 mg/dL       Estimated Glomerular Filtration Rate:  On admission, based on a Creatinine of 1.18 mg/dl:   Estimated GFR using CKD-EPI = 75.1 mL/min/1.73m2   Estimated GFR using MDRD = 71.6 mL/min/1.73m2      Assessment: Primary Rehabilitation Diagnosis  1. Impaired Mobility and ADLs  2. Acute Ischemic Stroke (acute right basal ganglia region infarct) with residual left hemiparesis and mild cognitive-linguistic deficits     Comorbidities   Hypertensive heart disease without heart failure    Diastolic dysfunction without heart failure    Dyslipidemia with low high density lipoprotein (HDL) cholesterol with hypertriglyceridemia due to type 2 diabetes mellitus    Chronic kidney disease (CKD) stage G2/A1, mildly decreased glomerular filtration rate (GFR) between 60-89 mL/min/1.73 square meter and albuminuria creatinine ratio less than 30 mg/g    Type 2 diabetes mellitus with stage 2 chronic kidney disease     Obesity, Class I, BMI 30-34.9    Vitamin D insufficiency       Plan:     1. Justification for continued stay: Good progression towards established rehabilitation goals. 2. Medical Issues being followed closely:    [x]  Fall and safety precautions     []  Wound Care     [x]  Bowel and Bladder Function     [x]  Fluid Electrolyte and Nutrition Balance     []  Pain Control      3. Issues that 24 hour rehabilitation nursing is following:    [x]  Fall and safety precautions     []  Wound Care     [x]  Bowel and Bladder Function     [x]  Fluid Electrolyte and Nutrition Balance     []  Pain Control      [x]  Assistance with and education on in-room safety with transfers to and from the bed, wheelchair, toilet and shower. 4. Acute rehabilitation plan of care:    [x]  Continue current care and rehab. [x]  Physical Therapy           [x]  Occupational Therapy           [x]  Speech Therapy     []  Hold Rehab until further notice     5. Medications:    [x]  MAR Reviewed     [x]  Continue Present Medications     6. DVT Prophylaxis:      []  Lovenox     [x]  Unfractionated Heparin     []  Coumadin     []  Xarelto     [x]  MARY Stockings     []  Sequential Compression Device     []  None     7.  Orders:   > Acute Ischemic Stroke (acute right basal ganglia region infarct) with residual left hemiparesis and mild cognitive-linguistic deficits   > Continue:    > Aspirin 325 mg PO once daily with breakfast    > Atorvastatin 40 mg PO q HS     > Dyslipidemia with low high density lipoprotein (HDL) cholesterol with hypertriglyceridemia   > Patient was started on Atorvastatin 40 mg PO q HS at [facility]    > Lipid profile (4/12/2017): , , HDL 29, LDL 55   > On 4/12/2017, added Lovaza 1 gram PO BID   > Continue:    > Atorvastatin 40 mg PO q HS     > Lovaza 1 gram PO BID    > Hypertensive heart disease without heart failure   > Upon admission to the ARU:    > Discontinued Hydrochlorothiazide 25 mg PO once daily    > Increased Amlodipine from 5 mg to 10 mg PO once daily (6AM)    > Increased Lisinopril from 20 mg to 40 mg PO once daily (9AM)    > Added Hydralazine 25 mg PO TID PRN for SBP greater than 150 mmHg or DBP greater than 100 mmHg   > On 4/12/2017, added Labetalol 100 mg PO q 12 hr (9AM, 9PM)   > Continue:    > Amlodipine 10 mg PO once daily (6AM)    > Lisinopril 40 mg PO once daily (9AM)    > Labetalol 100 mg PO q 12 hr (9AM, 9PM)     > Discontinue Hydralazine 25 mg PO TID PRN for SBP greater than 150 mmHg or DBP greater than 100 mmHg     > Type 2 diabetes mellitus, newly diagnosed, with chronic kidney disease stage G2/A1   > HbA1c (4/12/2017) = 10.5   > Upon admission to the ARU:    > Added Metformin 500 mg PO BID with meals    > Decreased Levemir from 18 units to 15 units SC q HS   > Microalbumin/Creatinine ratio(4/12/2017) = 10 mg/g   > On 4/13/2017, added Glipizide 5 mg PO once daily before breakfast   > Continue:    > Metformin 500 mg PO BID with meals    > Glipizide 5 mg PO once daily before breakfast    > Decrease Levemir from 15 units to 10 units SC q HS    > Humalog insulin sliding scale SC TID AC only     > Vitamin D Insufficiency   > Vitamin D 25-Hydroxy (4/12/2017) = 22.6   > Patient was given Cholecalciferol 50,000 units PO x 1 dose today   > On 4/13/2017, patient was started on Cholecalciferol 2,000 units PO once daily     > Continue Cholecalciferol 2,000 units PO once daily        8. Patient's progress in rehabilitation and medical issues discussed with the patient. All questions answered to the best of my ability. Care plan discussed with patient, mother and nurse. 9. Discharge Planning:   > For discharge to home on Tuesday (4/18/2017)   > Outpatient physical therapy, occupational therapy and speech therapy   > F/U: 1. PCP (Dr. Suzie Harmon)    2.  Neurology (Dr. Eliza Tafoya)      Signed:    Erna Arnold MD    April 14, 2017

## 2017-04-14 NOTE — PROGRESS NOTES
Problem: Mobility Impaired (Adult and Pediatric)  Goal: *Acute Goals and Plan of Care (Insert Text)  Physical Therapy Goals  Initiated 2017 and to be accomplished within 8 day(s)  1. Patient will move from supine to sit and sit to supine , scoot up and down and roll side to side in bed with independence. 2. Patient will transfer from bed to chair and chair to bed with independence using the least restrictive device. 3. Patient will perform sit to stand with independence. 4. Patient will ambulate with independence for 250 feet with the least restrictive device. 5. Patient will ascend/descend 12 stairs with 1 handrail(s) with modified independence. PHYSICAL THERAPY DAILY NOTE  Patient Name:Juan Richardson [place]  Time In: 4080  Time Out: 46  Patient Seen For: Therapeutic exercise;Gait training;Balance activities  Diagnosis: CVA  Acute ischemic stroke ([facility] Acute ischemic stroke ([facility] [facility]  Precautions: minimal fall risk     Subjective: Pt eager and willing to participate. \"I'll do whatever you tell me cause its gonna help me get better. \"     Pain at start of tx: no c/o   Pain at stop of tx: no c/o      Patient identified with name and : yes          Objective:       TRANSFERS Daily Assessment     Sit to Stand Assistance: Modified independent   Pt Varsha with all txfrs and Varsha within room. GAIT Daily Assessment     Amount of Assistance: 5 (Supervision/setup)  Distance (ft): 2000 Feet (ft)  Assistive Device:  (no AD)   Pt Varsha with ambulation on unit. Pt ambulated to and from [address] [place] [address]. Pt ambulated 150ft bouncing ball to challenge balance with added distraction but not challenging to pt's balance, only challenging to pt's LUE to use LUE only.  Pt ambulated 2000ft at end of session outside on uneven pavement with increased pace and dist S.           STEPS or STAIRS Daily Assessment     Steps/Stairs Ambulated (#): 24 (in stairwell)  Level of Assist : 5 (Supervision/setup)  Rail Use: Right    Pt negotiated up and down 24 stairs with RHR and S/dist S performing step through pattern for ascending and descending. BALANCE Daily Assessment     Sitting - Static: Good (unsupported)  Sitting - Dynamic: Good (unsupported)  Standing - Static: Good  Standing - Dynamic : Intact   Pt participated in static standing and mini squats on inverted bosu ball and marching on top of bosu ball to challenge balance and for BLE strengthening. Pt performed braiding stepping x3 20ft trials with increased pace on 3rd trial with no LOB. Pt performed quadruped alternating opposing UE and LE lift to challenge balance and core strength. Pt performed high kneel anterior reaching to challenge and strengthen core, hip extensors and hamstrings, requiring Oc for balance due to difficulty. LOWER EXTREMITY EXERCISES Daily Assessment      Pt performed alternating ski lunges x10 B with LOB on last 2 on L, eccentric LLE step down from 6\" step x10, half kneel down and up 3x3 B with increased difficulty with RLE anterior and LLE posterior, and alternating high kneel to half kneel B x10. Assessment: Pt continues to make progress with LLE weakness and challenged only by very high level balance and strengthening tasks. Plan of Care: Continue with current POC to improve independence with functional mobility and increase safety awareness.       Evette Sandoval, PTA  4/14/2017

## 2017-04-14 NOTE — REHAB NOTE
[facility]    Bedside and Verbal shift change report given to Vijay Diego RN (oncoming nurse) by Simone Rowley RN (offgoing nurse). Report included the following information SBAR, Kardex, MAR and Recent Results.     Situation:   Code Status: Full Code   Reason for Admission: CVA  Hospital Day: 3   Problem List:   Hospital Problems  Date Reviewed: 4/14/2017          Codes Class Noted POA    Vitamin D insufficiency (Chronic) ICD-10-CM: E55.9  ICD-9-CM: 268.9  4/12/2017 Yes    Overview Signed 4/12/2017 10:03 AM by Lior Boateng MD     Vitamin D 25-Hydroxy (4/12/2017) = 22.6             Chronic kidney disease (CKD) stage G2/A1, mildly decreased glomerular filtration rate (GFR) between 60-89 mL/min/1.73 square meter and albuminuria creatinine ratio less than 30 mg/g (Chronic) ICD-10-CM: N18.2  ICD-9-CM: 585.2  4/12/2017 Yes        Hypertensive heart disease without heart failure (Chronic) ICD-10-CM: I11.9  ICD-9-CM: 402.90  Unknown Yes        * (Principal)Acute ischemic stroke ([facility] [address] ICD-10-CM: I63.9  ICD-9-CM: 434.91  4/6/2017 Yes    Overview Addendum 4/12/2017  5:37 PM by Lior Boateng MD     Acute Ischemic Stroke (acute right basal ganglia region infarct) with residual left hemiparesis and mild cognitive-linguistic deficits             Impaired mobility and ADLs ICD-10-CM: Z74.09  ICD-9-CM: 799.89  4/6/2017 Yes        Hemiparesis affecting left side as late effect of cerebrovascular accident (CVA) ([facility] [address] [facility] ICD-10-CM: M95.510  ICD-9-CM: 438.20  4/6/2017 Yes        Dyslipidemia with low high density lipoprotein (HDL) cholesterol with hypertriglyceridemia due to type 2 diabetes mellitus (HCC) (Chronic) ICD-10-CM: E11.69, E78.6, E78.1  ICD-9-CM: 250.80, 272.4  4/6/2017 Yes    Overview Signed 4/12/2017 10:10 AM by Lior Boateng MD     Lipid profile (4/12/2017): , , HDL 29, LDL 55             Type 2 diabetes mellitus with stage 2 chronic kidney disease ([facility] ICD-10-CM: E11.22, N18.2  ICD-9-CM: 250.40, 585.2  4/6/2017 Yes    Overview Signed 4/13/2017  1:51 PM by Rebeca Pederson MD     HbA1c (4/12/2017) = 10.5                   Background:   Past Medical History:   Past Medical History:   Diagnosis Date    Acute ischemic stroke ([facility] 04/06/2017    Acute Ischemic Stroke (acute right basal ganglia region infarct) with residual left hemiparesis and mild cognitive-linguistic deficits    Chronic kidney disease (CKD) stage G2/A1, mildly decreased glomerular filtration rate (GFR) between 60-89 mL/min/1.73 square meter and albuminuria creatinine ratio less than 30 mg/g 3/32/1077    Diastolic dysfunction without heart failure     Dyslipidemia with low high density lipoprotein (HDL) cholesterol with hypertriglyceridemia due to type 2 diabetes mellitus ([facility] 4/6/2017    Lipid profile (4/12/2017): , , HDL 29, LDL 55    Hemiparesis affecting left side as late effect of cerebrovascular accident (CVA) ([facility] 4/6/2017    Obesity, Class I, BMI 30-34.9     Type 2 diabetes mellitus with stage 2 chronic kidney disease ([facility] 04/06/2017    HbA1c (4/12/2017) = 10.5    Vitamin D insufficiency 4/12/2017    Vitamin D 25-Hydroxy (4/12/2017) = 22.6      Patient taking anticoagulants  yes   Patient has a defibrillator: no     Assessment:   Changes in Assessment throughout shift: no     Patient has central line: no Reasons if yes: Insertion date: Last dressing date:   Patient has Pearce Cath: no Reasons if yes:     Insertion date:     Last Vitals:     Vitals:    04/13/17 2134 04/14/17 0611 04/14/17 0724 04/14/17 1538   BP: 143/89 121/80 126/78 121/77   Pulse: 88 68 76 78   Resp: 20  20 18   Temp: 98.7 °F (37.1 °C)  98.2 °F (36.8 °C) 99.2 °F (37.3 °C)   SpO2: 97%  95% 92%   Weight:       Height:            PAIN    Pain Assessment    Pain Intensity 1: 0 (04/14/17 1600) Pain Intensity 1: 2 (12/29/14 1105)      Pain Location 1: Abdomen      Pain Intervention(s) 1: Medication (see MAR)  Patient Stated Pain Goal: 0 Patient Stated Pain Goal: 0  o Intervention effective:  yes   o Other actions taken for pain: No complaints of pain     Skin Assessment  Skin color    Condition/Temperature    Integrity    Turgor    Weekly Pressure Ulcer Documentation Weekly Pressure Ulcer Documentation: No Pressure Ulcer Noted-Pressure Ulcer Prevention Initiated  Wound Prevention & Protection Methods  Orientation of wound Orientation of Wound Prevention: Posterior  Location of Prevention Location of Wound Prevention: Sacrum/Coccyx  Dressing Present Dressing Present : No  Dressing Status    Wound Offloading Wound Offloading (Prevention Methods): Bed, pressure redistribution/air     INTAKE/OUPUT    Date 04/13/17 1900 - 04/14/17 0659 04/14/17 0700 - 04/15/17 0659   Shift 0182-5873 24 Hour Total 1882-1148 1394-7641 24 Hour Total   I  N  T  A  K  E   P.O.  620 500  500      P. O.  620 500  500    Shift Total  (mL/kg)  620  (6.8) 500  (5.5)  500  (5.5)   O  U  T  P  U  T   Urine  (mL/kg/hr)           Urine Occurrence(s) 0 x 4 x 0 x  0 x    Stool           Stool Occurrence(s) 0 x 1 x 0 x  0 x    Shift Total  (mL/kg)        NET  620 500  500   Weight (kg) 91.1 91.1 91.1 91.1 91.1       Recommendations:  1. Patient needs and requests: none at this time    2. Diet: diabetic consistent regular    3. Pending tests/procedures: am labs     4. Functional Level/Equipment: assist x 1/whellchair    5. Estimated Discharge Date: tbd Posted on Whiteboard in Patients Room: no     [facility] Safety Check    A safety check occurred in the patient's room between off going nurse and oncoming nurse listed above.     The safety check included the below items  Area Items   H  High Alert Medications - Verify all high alert medication drips (heparin, PCA, etc.)   E  Equipment - Suction is set up for ALL patients (with yanker)  - Red plugs utilized for all equipment (IV pumps, etc.)  - WOWs wiped down at end of shift.  - Room stocked with oxygen, suction, and other unit-specific supplies   A  Alarms - Bed alarm is set for fall risk patients  - Ensure chair alarm is in place and activated if patient is up in a chair   L  Lines - Check IV for any infiltration  - Pearce bag is empty if patient has a Pearce   - Tubing and IV bags are labeled   S  Safety   - Room is clean, patient is clean, and equipment is clean. - Hallways are clear from equipment besides carts. - Fall bracelet on for fall risk patients  - Ensure room is clear and free of clutter  - Suction is set up for ALL patients (with yanker)  - Hallways are clear from equipment besides carts.    - Isolation precautions followed, supplies available outside room, sign posted

## 2017-04-14 NOTE — PROGRESS NOTES
Problem: Self Care Deficits Care Plan (Adult)  Goal: *Acute Goals and Plan of Care (Insert Text)  Short Term Goals = Long Term Goals (to be met upon discharge date) in order to increase pts functional independence and safety, and decrease burden of care:  1. Pt will perform self-feeding with independence. 2. Pt will perform grooming with independence. 3. Pt will perform UB bathing with modified independence. 4. Pt will perform LB bathing with modified independence. 5. Pt will perform tub/shower transfer with modified independence. 6. Pt will perform UB dressing with independence. 7. Pt will perform LB dressing with modified independence. 8. Pt will perform toileting task with modified independence. 9. Pt will perform toilet transfer with modified independence. 10. Pt will perform an IADL task with modified independence. OCCUPATIONAL THERAPY DAILY NOTE  Patient Name:Juan Irizarry  Time In: 0800  Time Out: 0900    Time In: 7208  Time Out: 2904     Medical Diagnosis:  CVA  Acute ischemic stroke ([address] Acute ischemic stroke ([facility]      Pain at start of tx: 0/10  Pain at stop of tx: 0/10     Patient identified with name and : yes  Subjective: Pt asks, \"Is it normal for these fingers to bed? \" when attempting to perform finger scissor theraputty exercise. Objective:      THERAPEUTIC ACTIVITY Daily Assessment     Pt played \"Don't Spill the Beans\" to challenge FM coordination in his hand. He was able to , transfer, and place small beans utilizing L hand pincer grasp with only slightly additional time and effort needed to pinch one bead at a time while obtaining them from individual player's tray. Pt completed keyhole peg placement using L hand only. He was able to  and manipulate pegs in hand to orient them in the correct direction to place them in the pegboard.  Pt also performed coin management to  pennies from the tabletop with pincer grasp and place them into slot cut into plastic container lid. Pt also participated in stereognosis activity in which pt was able to locate and identify various items. Then pt was transitioned to finding specific items named by OT. He needed additional time and effort for locating smaller objects including rubber band and paper clip. THERAPEUTIC EXERCISE Daily Assessment     Pt completed L hand strengthening utilizing yellow theraputty. Exercises included small bead retrieval utilizing palmar press and pinch x12 beads, finger scissor, finger pinch, and scissor spread. IADL Daily Assessment     Pt completed simple meal prep activity preparing fried eggs with modified independence while ambulating kitchen to obtain all items needed for the task. He was able to safely manage items on the stovetop while cooking. Assessment: Pt remains highly engaged and motivated with occupational therapy and is progressing well with LUE function. Plan of Care: Continue POC to maximize pt independence and safety performing ADLs and functional transfers/mobility.      Fan Shaffer, OTR  4/14/2017

## 2017-04-15 LAB
GLUCOSE BLD STRIP.AUTO-MCNC: 101 MG/DL (ref 70–110)
GLUCOSE BLD STRIP.AUTO-MCNC: 106 MG/DL (ref 70–110)
GLUCOSE BLD STRIP.AUTO-MCNC: 87 MG/DL (ref 70–110)
GLUCOSE BLD STRIP.AUTO-MCNC: 98 MG/DL (ref 70–110)

## 2017-04-15 PROCEDURE — 97110 THERAPEUTIC EXERCISES: CPT

## 2017-04-15 PROCEDURE — 74011250637 HC RX REV CODE- 250/637: Performed by: INTERNAL MEDICINE

## 2017-04-15 PROCEDURE — 74011250636 HC RX REV CODE- 250/636: Performed by: INTERNAL MEDICINE

## 2017-04-15 PROCEDURE — 65310000000 HC RM PRIVATE REHAB

## 2017-04-15 PROCEDURE — 82962 GLUCOSE BLOOD TEST: CPT

## 2017-04-15 RX ADMIN — ATORVASTATIN CALCIUM 40 MG: 40 TABLET, FILM COATED ORAL at 21:00

## 2017-04-15 RX ADMIN — AMLODIPINE BESYLATE 10 MG: 5 TABLET ORAL at 05:44

## 2017-04-15 RX ADMIN — METFORMIN HYDROCHLORIDE 500 MG: 500 TABLET, FILM COATED ORAL at 17:21

## 2017-04-15 RX ADMIN — GLIPIZIDE 5 MG: 5 TABLET ORAL at 06:37

## 2017-04-15 RX ADMIN — LABETALOL HYDROCHLORIDE 100 MG: 100 TABLET, FILM COATED ORAL at 21:00

## 2017-04-15 RX ADMIN — VITAMIN D, TAB 1000IU (100/BT) 2000 UNITS: 25 TAB at 08:57

## 2017-04-15 RX ADMIN — ASPIRIN 325 MG ORAL TABLET 325 MG: 325 PILL ORAL at 08:57

## 2017-04-15 RX ADMIN — HEPARIN SODIUM 5000 UNITS: 5000 INJECTION, SOLUTION INTRAVENOUS; SUBCUTANEOUS at 14:46

## 2017-04-15 RX ADMIN — OMEGA-3-ACID ETHYL ESTERS 1000 MG: 900 CAPSULE, LIQUID FILLED ORAL at 08:57

## 2017-04-15 RX ADMIN — METFORMIN HYDROCHLORIDE 500 MG: 500 TABLET, FILM COATED ORAL at 08:57

## 2017-04-15 RX ADMIN — LABETALOL HYDROCHLORIDE 100 MG: 100 TABLET, FILM COATED ORAL at 08:57

## 2017-04-15 RX ADMIN — OMEGA-3-ACID ETHYL ESTERS 1000 MG: 900 CAPSULE, LIQUID FILLED ORAL at 17:21

## 2017-04-15 RX ADMIN — HEPARIN SODIUM 5000 UNITS: 5000 INJECTION, SOLUTION INTRAVENOUS; SUBCUTANEOUS at 21:00

## 2017-04-15 RX ADMIN — HEPARIN SODIUM 5000 UNITS: 5000 INJECTION, SOLUTION INTRAVENOUS; SUBCUTANEOUS at 05:45

## 2017-04-15 RX ADMIN — LISINOPRIL 40 MG: 20 TABLET ORAL at 08:57

## 2017-04-15 NOTE — REHAB NOTE
SHIFT CHANGE NOTE FOR [facility]    Bedside and Verbal shift change report given to Jamal RN (oncoming nurse) by Penny Faith RN  , RN (offgoing nurse). Report included the following information SBAR, Kardex, MAR and Recent Results.     Situation:   Code Status: Full Code   Reason for Admission: CVA  Hospital Day: 4   Problem List:   Hospital Problems  Date Reviewed: 4/14/2017          Codes Class Noted POA    Vitamin D insufficiency (Chronic) ICD-10-CM: E55.9  ICD-9-CM: 268.9  4/12/2017 Yes    Overview Signed 4/12/2017 10:03 AM by Bridget Fam MD     Vitamin D 25-Hydroxy (4/12/2017) = 22.6             Chronic kidney disease (CKD) stage G2/A1, mildly decreased glomerular filtration rate (GFR) between 60-89 mL/min/1.73 square meter and albuminuria creatinine ratio less than 30 mg/g (Chronic) ICD-10-CM: N18.2  ICD-9-CM: 585.2  4/12/2017 Yes        Hypertensive heart disease without heart failure (Chronic) ICD-10-CM: I11.9  ICD-9-CM: 402.90  Unknown Yes        * (Principal)Acute ischemic stroke ([facility] ICD-10-CM: I63.9  ICD-9-CM: 434.91  4/6/2017 Yes    Overview Addendum 4/12/2017  5:37 PM by Bridget Fam MD     Acute Ischemic Stroke (acute right basal ganglia region infarct) with residual left hemiparesis and mild cognitive-linguistic deficits             Impaired mobility and ADLs ICD-10-CM: Z74.09  ICD-9-CM: 799.89  4/6/2017 Yes        Hemiparesis affecting left side as late effect of cerebrovascular accident (CVA) ([facility] ICD-10-CM: E35.643  ICD-9-CM: 438.20  4/6/2017 Yes        Dyslipidemia with low high density lipoprotein (HDL) cholesterol with hypertriglyceridemia due to type 2 diabetes mellitus (HCC) (Chronic) ICD-10-CM: E11.69, E78.6, E78.1  ICD-9-CM: 250.80, 272.4  4/6/2017 Yes    Overview Signed 4/12/2017 10:10 AM by Bridget Fam MD     Lipid profile (4/12/2017): , , HDL 29, LDL 55             Type 2 diabetes mellitus with stage 2 chronic kidney disease ([facility] ICD-10-CM: E11.22, N18.2  ICD-9-CM: 250.40, 585.2  4/6/2017 Yes    Overview Signed 4/13/2017  1:51 PM by Roman Stafford MD     HbA1c (4/12/2017) = 10.5                   Background:   Past Medical History:   Past Medical History:   Diagnosis Date    Acute ischemic stroke ([facility] 04/06/2017    Acute Ischemic Stroke (acute right basal ganglia region infarct) with residual left hemiparesis and mild cognitive-linguistic deficits    Chronic kidney disease (CKD) stage G2/A1, mildly decreased glomerular filtration rate (GFR) between 60-89 mL/min/1.73 square meter and albuminuria creatinine ratio less than 30 mg/g 5/13/7491    Diastolic dysfunction without heart failure     Dyslipidemia with low high density lipoprotein (HDL) cholesterol with hypertriglyceridemia due to type 2 diabetes mellitus ([facility] 4/6/2017    Lipid profile (4/12/2017): , , HDL 29, LDL 55    Hemiparesis affecting left side as late effect of cerebrovascular accident (CVA) ([facility] 4/6/2017    Obesity, Class I, BMI 30-34.9     Type 2 diabetes mellitus with stage 2 chronic kidney disease ([facility] 04/06/2017    HbA1c (4/12/2017) = 10.5    Vitamin D insufficiency 4/12/2017    Vitamin D 25-Hydroxy (4/12/2017) = 22.6      Patient taking anticoagulants  yes   Patient has a defibrillator: no     Assessment:   Changes in Assessment throughout shift: no     Patient has central line: no Reasons if yes: Insertion date: Last dressing date:   Patient has Pearce Cath: no Reasons if yes:     Insertion date:     Last Vitals:     Vitals:    04/14/17 2301 04/15/17 0544 04/15/17 0800 04/15/17 1609   BP: 124/78 125/75 136/83 (!) 142/95   Pulse: 76 67 76 76   Resp: 18  18 18   Temp: 98.2 °F (36.8 °C)  97.2 °F (36.2 °C) 97.3 °F (36.3 °C)   SpO2: 96%  100% 99%   Weight:       Height:            PAIN    Pain Assessment    Pain Intensity 1: 0 (04/15/17 1609) Pain Intensity 1: 2 (12/29/14 1105)      Pain Location 1: Abdomen      Pain Intervention(s) 1: Medication (see MAR)  Patient Stated Pain Goal: 0 Patient Stated Pain Goal: 0  o Intervention effective:  yes   o Other actions taken for pain: No complaints of pain     Skin Assessment  Skin color    Condition/Temperature    Integrity    Turgor    Weekly Pressure Ulcer Documentation Weekly Pressure Ulcer Documentation: No Pressure Ulcer Noted-Pressure Ulcer Prevention Initiated  Wound Prevention & Protection Methods  Orientation of wound Orientation of Wound Prevention: Posterior  Location of Prevention Location of Wound Prevention: Sacrum/Coccyx  Dressing Present Dressing Present : No  Dressing Status    Wound Offloading Wound Offloading (Prevention Methods): Bed, pressure redistribution/air     INTAKE/OUPUT    Date 04/14/17 1900 - 04/15/17 0659 04/15/17 0700 - 04/16/17 0659   Shift 4841-2291 24 Hour Total 7522-5859 2424-2263 24 Hour Total   I  N  T  A  K  E   P. O.  500 400  400      P. O.  500 400  400    Shift Total  (mL/kg)  500  (5.5) 400  (4.4)  400  (4.4)   O  U  T  P  U  T   Urine  (mL/kg/hr)           Urine Occurrence(s) 0 x 0 x 5 x  5 x    Stool           Stool Occurrence(s) 0 x 0 x 3 x  3 x    Shift Total  (mL/kg)        NET  500 400  400   Weight (kg) 91.1 91.1 91.1 91.1 91.1       Recommendations:  1. Patient needs and requests: none at this time    2. Diet: diabetic consistent regular    3. Pending tests/procedures: am labs     4. Functional Level/Equipment: assist x 1/whellchair    5. Estimated Discharge Date: tbd Posted on Whiteboard in Patients Room: no     [facility] [place] Safety Check    A safety check occurred in the patient's room between off going nurse and oncoming nurse listed above.     The safety check included the below items  Area Items   H  High Alert Medications - Verify all high alert medication drips (heparin, PCA, etc.)   E  Equipment - Suction is set up for ALL patients (with yanker)  - Red plugs utilized for all equipment (IV pumps, etc.)  - WOWs wiped down at end of shift.  - Room stocked with oxygen, suction, and other unit-specific supplies   A  Alarms - Bed alarm is set for fall risk patients  - Ensure chair alarm is in place and activated if patient is up in a chair   L  Lines - Check IV for any infiltration  - Pearce bag is empty if patient has a Pearce   - Tubing and IV bags are labeled   S  Safety   - Room is clean, patient is clean, and equipment is clean. - Hallways are clear from equipment besides carts. - Fall bracelet on for fall risk patients  - Ensure room is clear and free of clutter  - Suction is set up for ALL patients (with gonzalez)  - Hallways are clear from equipment besides carts.    - Isolation precautions followed, supplies available outside room, sign posted

## 2017-04-15 NOTE — PROGRESS NOTES
[facility] PHYSICAL REHABILITATION  [address] [place] [address] [address]     INPATIENT REHABILITATION  DAILY PROGRESS NOTE     Date: 4/15/2017    Name: Aura Scott Age / Sex: 36 y.o. / male   CSN: [id number] MRN: [id number]   [address] Date: 4/11/2017 Length of Stay: 4 days     Primary Rehab Diagnosis: Impaired Mobility and ADLs secondary to Acute Ischemic Stroke (acute right basal ganglia region infarct) with residual left hemiparesis and mild cognitive-linguistic deficits      Subjective:     No new issues or problems reported. Blood pressure controlled. Blood sugars controlled.    Levemir was not given last night (re: not available from pharmacy)      Objective:     Vital Signs:  Patient Vitals for the past 24 hrs:   BP Temp Pulse Resp SpO2   04/15/17 0544 125/75 - 67 - -   04/14/17 2301 124/78 98.2 °F (36.8 °C) 76 18 96 %   04/14/17 1538 121/77 99.2 °F (37.3 °C) 78 18 92 %        Current Medications:  Current Facility-Administered Medications   Medication Dose Route Frequency    insulin detemir (LEVEMIR) injection 10 Units  10 Units SubCUTAneous QHS    cholecalciferol (VITAMIN D3) tablet 2,000 Units  2,000 Units Oral DAILY    lisinopril (PRINIVIL, ZESTRIL) tablet 40 mg  40 mg Oral DAILY    omega-3 acid ethyl esters (LOVAZA) capsule 1,000 mg  1 g Oral BID WITH MEALS    glipiZIDE (GLUCOTROL) tablet 5 mg  5 mg Oral ACB    labetalol (NORMODYNE) tablet 100 mg  100 mg Oral Q12H    acetaminophen (TYLENOL) tablet 650 mg  650 mg Oral Q4H PRN    bisacodyl (DULCOLAX) tablet 10 mg  10 mg Oral Q48H PRN    heparin (porcine) injection 5,000 Units  5,000 Units SubCUTAneous Q8H    insulin lispro (HUMALOG) injection   SubCUTAneous TIDAC    aspirin (ASPIRIN) tablet 325 mg  325 mg Oral DAILY WITH BREAKFAST    atorvastatin (LIPITOR) tablet 40 mg  40 mg Oral QHS    amLODIPine (NORVASC) tablet 10 mg  10 mg Oral DAILY    metFORMIN (GLUCOPHAGE) tablet 500 mg  500 mg Oral BID WITH MEALS Allergies:  No Known Allergies      Lab/Data Review:  Recent Results (from the past 24 hour(s))   GLUCOSE, POC    Collection Time: 04/14/17 12:31 PM   Result Value Ref Range    Glucose (POC) 100 70 - 110 mg/dL   GLUCOSE, POC    Collection Time: 04/14/17  4:31 PM   Result Value Ref Range    Glucose (POC) 93 70 - 110 mg/dL   GLUCOSE, POC    Collection Time: 04/14/17  8:29 PM   Result Value Ref Range    Glucose (POC) 136 (H) 70 - 110 mg/dL   GLUCOSE, POC    Collection Time: 04/15/17  8:27 AM   Result Value Ref Range    Glucose (POC) 87 70 - 110 mg/dL       Estimated Glomerular Filtration Rate:  On admission, based on a Creatinine of 1.18 mg/dl:   Estimated GFR using CKD-EPI = 75.1 mL/min/1.73m2   Estimated GFR using MDRD = 71.6 mL/min/1.73m2      Assessment:     Primary Rehabilitation Diagnosis  1. Impaired Mobility and ADLs  2. Acute Ischemic Stroke (acute right basal ganglia region infarct) with residual left hemiparesis and mild cognitive-linguistic deficits     Comorbidities   Hypertensive heart disease without heart failure    Diastolic dysfunction without heart failure    Dyslipidemia with low high density lipoprotein (HDL) cholesterol with hypertriglyceridemia due to type 2 diabetes mellitus    Chronic kidney disease (CKD) stage G2/A1, mildly decreased glomerular filtration rate (GFR) between 60-89 mL/min/1.73 square meter and albuminuria creatinine ratio less than 30 mg/g    Type 2 diabetes mellitus with stage 2 chronic kidney disease     Obesity, Class I, BMI 30-34.9    Vitamin D insufficiency       Plan:     1. Justification for continued stay: Good progression towards established rehabilitation goals. 2. Medical Issues being followed closely:    [x]  Fall and safety precautions     []  Wound Care     [x]  Bowel and Bladder Function     [x]  Fluid Electrolyte and Nutrition Balance     []  Pain Control      3.  Issues that 24 hour rehabilitation nursing is following:    [x]  Fall and safety precautions     []  Wound Care     [x]  Bowel and Bladder Function     [x]  Fluid Electrolyte and Nutrition Balance     []  Pain Control      [x]  Assistance with and education on in-room safety with transfers to and from the bed, wheelchair, toilet and shower. 4. Acute rehabilitation plan of care:    [x]  Continue current care and rehab. [x]  Physical Therapy           [x]  Occupational Therapy           [x]  Speech Therapy     []  Hold Rehab until further notice     5. Medications:    [x]  MAR Reviewed     [x]  Continue Present Medications     6. DVT Prophylaxis:      []  Lovenox     [x]  Unfractionated Heparin     []  Coumadin     []  Xarelto     [x]  MARY Stockings     []  Sequential Compression Device     []  None     7.  Orders:   > Acute Ischemic Stroke (acute right basal ganglia region infarct) with residual left hemiparesis and mild cognitive-linguistic deficits   > Continue:    > Aspirin 325 mg PO once daily with breakfast    > Atorvastatin 40 mg PO q HS     > Dyslipidemia with low high density lipoprotein (HDL) cholesterol with hypertriglyceridemia   > Patient was started on Atorvastatin 40 mg PO q HS at [facility]    > Lipid profile (4/12/2017): , , HDL 29, LDL 55   > On 4/12/2017, added Lovaza 1 gram PO BID   > Continue:    > Atorvastatin 40 mg PO q HS     > Lovaza 1 gram PO BID    > Hypertensive heart disease without heart failure   > Upon admission to the ARU:    > Discontinued Hydrochlorothiazide 25 mg PO once daily    > Increased Amlodipine from 5 mg to 10 mg PO once daily (6AM)    > Increased Lisinopril from 20 mg to 40 mg PO once daily (9AM)    > Added Hydralazine 25 mg PO TID PRN for SBP greater than 150 mmHg or DBP greater than 100 mmHg   > On 4/12/2017, added Labetalol 100 mg PO q 12 hr (9AM, 9PM)   > On 4/14/2017, discontinued Hydralazine 25 mg PO TID PRN for SBP greater than 150 mmHg or DBP greater than 100 mmHg   > Continue:    > Amlodipine 10 mg PO once daily (6AM)    > Lisinopril 40 mg PO once daily (9AM)    > Labetalol 100 mg PO q 12 hr (9AM, 9PM)       > Type 2 diabetes mellitus, newly diagnosed, with chronic kidney disease stage G2/A1   > HbA1c (4/12/2017) = 10.5   > Upon admission to the ARU:    > Added Metformin 500 mg PO BID with meals    > Decreased Levemir from 18 units to 15 units SC q HS   > Microalbumin/Creatinine ratio(4/12/2017) = 10 mg/g   > On 4/13/2017, added Glipizide 5 mg PO once daily before breakfast   > On 4/14/2017, decreased Levemir from 15 units to 10 units SC q HS   > Discontinue Levemir 10 units SC q HS   > Continue:    > Metformin 500 mg PO BID with meals    > Glipizide 5 mg PO once daily before breakfast    > Humalog insulin sliding scale SC TID AC only     > Vitamin D Insufficiency   > Vitamin D 25-Hydroxy (4/12/2017) = 22.6   > Patient was given Cholecalciferol 50,000 units PO x 1 dose today   > On 4/13/2017, patient was started on Cholecalciferol 2,000 units PO once daily     > Continue Cholecalciferol 2,000 units PO once daily        8. Discharge Planning:   > For discharge to home on Tuesday (4/18/2017)   > Outpatient physical therapy, occupational therapy and speech therapy   > F/U: 1. PCP (Dr. Forest Beck)    2.  Neurology (Dr. Ronni Morgan)      Signed:    Vick Sahu MD    April 15, 2017

## 2017-04-15 NOTE — PROGRESS NOTES
Problem: Self Care Deficits Care Plan (Adult)  Goal: *Acute Goals and Plan of Care (Insert Text)  Short Term Goals = Long Term Goals (to be met upon discharge date) in order to increase pts functional independence and safety, and decrease burden of care:  1. Pt will perform self-feeding with independence. 2. Pt will perform grooming with independence. 3. Pt will perform UB bathing with modified independence. 4. Pt will perform LB bathing with modified independence. 5. Pt will perform tub/shower transfer with modified independence. 6. Pt will perform UB dressing with independence. 7. Pt will perform LB dressing with modified independence. 8. Pt will perform toileting task with modified independence. 9. Pt will perform toilet transfer with modified independence. 10. Pt will perform an IADL task with modified independence. OCCUPATIONAL THERAPY DAILY NOTE  Patient Name:Juan Walker        Medical Diagnosis:  CVA  Acute ischemic stroke [facility]) Acute ischemic stroke ([address]      Pain at start of tx:0  Pain at stop of tx:0     Patient identified with name and : yes     Subjective: \"Its just the left hand that is still hard for me to hold things\"     Objective:      THERAPEUTIC EXERCISE Daily Assessment   Blue putty roll digit ext 5'  L cone wrist flex/ext in blue putty 5'  Dowel press blue putty 5'  velcro board SML dowel x 5 reps each  3 jaw mina removing and placing 1 inch blocks on velcro board 5'    Pt fatigued with exercises requiring pt to maintain digit ext         Assessment: Session focused on intense hand exercise to increase functional  and digit ext. Pt would benefit from OP OT.      Plan of Care: [address] [place] [address]  4/15/2017

## 2017-04-16 LAB
GLUCOSE BLD STRIP.AUTO-MCNC: 109 MG/DL (ref 70–110)
GLUCOSE BLD STRIP.AUTO-MCNC: 113 MG/DL (ref 70–110)
GLUCOSE BLD STRIP.AUTO-MCNC: 123 MG/DL (ref 70–110)
GLUCOSE BLD STRIP.AUTO-MCNC: 127 MG/DL (ref 70–110)

## 2017-04-16 PROCEDURE — 82962 GLUCOSE BLOOD TEST: CPT

## 2017-04-16 PROCEDURE — 74011250637 HC RX REV CODE- 250/637: Performed by: INTERNAL MEDICINE

## 2017-04-16 PROCEDURE — 74011250636 HC RX REV CODE- 250/636: Performed by: INTERNAL MEDICINE

## 2017-04-16 PROCEDURE — 97110 THERAPEUTIC EXERCISES: CPT

## 2017-04-16 PROCEDURE — 65310000000 HC RM PRIVATE REHAB

## 2017-04-16 RX ADMIN — GLIPIZIDE 5 MG: 5 TABLET ORAL at 06:32

## 2017-04-16 RX ADMIN — HEPARIN SODIUM 5000 UNITS: 5000 INJECTION, SOLUTION INTRAVENOUS; SUBCUTANEOUS at 15:32

## 2017-04-16 RX ADMIN — OMEGA-3-ACID ETHYL ESTERS 1000 MG: 900 CAPSULE, LIQUID FILLED ORAL at 17:39

## 2017-04-16 RX ADMIN — METFORMIN HYDROCHLORIDE 500 MG: 500 TABLET, FILM COATED ORAL at 08:51

## 2017-04-16 RX ADMIN — LABETALOL HYDROCHLORIDE 100 MG: 100 TABLET, FILM COATED ORAL at 08:53

## 2017-04-16 RX ADMIN — LABETALOL HYDROCHLORIDE 100 MG: 100 TABLET, FILM COATED ORAL at 21:16

## 2017-04-16 RX ADMIN — VITAMIN D, TAB 1000IU (100/BT) 2000 UNITS: 25 TAB at 08:51

## 2017-04-16 RX ADMIN — HEPARIN SODIUM 5000 UNITS: 5000 INJECTION, SOLUTION INTRAVENOUS; SUBCUTANEOUS at 05:59

## 2017-04-16 RX ADMIN — ASPIRIN 325 MG ORAL TABLET 325 MG: 325 PILL ORAL at 08:51

## 2017-04-16 RX ADMIN — LISINOPRIL 40 MG: 20 TABLET ORAL at 08:51

## 2017-04-16 RX ADMIN — HEPARIN SODIUM 5000 UNITS: 5000 INJECTION, SOLUTION INTRAVENOUS; SUBCUTANEOUS at 21:16

## 2017-04-16 RX ADMIN — AMLODIPINE BESYLATE 10 MG: 5 TABLET ORAL at 05:58

## 2017-04-16 RX ADMIN — ATORVASTATIN CALCIUM 40 MG: 40 TABLET, FILM COATED ORAL at 21:16

## 2017-04-16 RX ADMIN — METFORMIN HYDROCHLORIDE 500 MG: 500 TABLET, FILM COATED ORAL at 17:39

## 2017-04-16 RX ADMIN — OMEGA-3-ACID ETHYL ESTERS 1000 MG: 900 CAPSULE, LIQUID FILLED ORAL at 08:51

## 2017-04-16 NOTE — PROGRESS NOTES
Problem: Self Care Deficits Care Plan (Adult)  Goal: *Acute Goals and Plan of Care (Insert Text)  Short Term Goals = Long Term Goals (to be met upon discharge date) in order to increase pts functional independence and safety, and decrease burden of care:  1. Pt will perform self-feeding with independence. 2. Pt will perform grooming with independence. 3. Pt will perform UB bathing with modified independence. 4. Pt will perform LB bathing with modified independence. 5. Pt will perform tub/shower transfer with modified independence. 6. Pt will perform UB dressing with independence. 7. Pt will perform LB dressing with modified independence. 8. Pt will perform toileting task with modified independence. 9. Pt will perform toilet transfer with modified independence. 10. Pt will perform an IADL task with modified independence. OCCUPATIONAL THERAPY DAILY NOTE  Patient Name:Timothy Cheryll Mcardle  Time Spent With Patient  Time In: 2507  Time Out: 200     Medical Diagnosis:  CVA  Acute ischemic stroke [facility]) Acute ischemic stroke ([facility]      Pain at start of tx:zero  Pain at stop of tx:zero     Patient identified with name and :yes  Subjective: \"Yes, I will work with you. \"     Objective:      THERAPEUTIC EXERCISE Daily Assessment     Completed 10 minutes on the Sci[employer]; red therabar all planes; red digit flex for gross  Grasp as well as 1:1 digit flexion;  yellow, red & green clothespins for  Digit flexion & bean bag ; intrinsic strengthening with wooden marbles; and AROM of his L hand to UE. MOBILITY/TRANSFERS Daily Assessment       Independent with transfers & ambulated safely to & from his room & gym. Assessment: Excellent tx participation & motivation noted. He completed all requested tasks in the clinic. Plan of Care: Continue poc to maximize function & meet his eval goals.      Fei Izaguirre OTR/MARIO  2017

## 2017-04-16 NOTE — REHAB NOTE
SHIFT CHANGE NOTE FOR [facility]    Bedside and Verbal shift change report given to YULIANA Kebede (oncoming nurse) by Mickey Chappell RN  , RN (offgoing nurse). Report included the following information SBAR, Kardex, MAR and Recent Results.     Situation:   Code Status: Full Code   Reason for Admission: CVA  Hospital Day: 5   Problem List:   Hospital Problems  Date Reviewed: 4/14/2017          Codes Class Noted POA    Vitamin D insufficiency (Chronic) ICD-10-CM: E55.9  ICD-9-CM: 268.9  4/12/2017 Yes    Overview Signed 4/12/2017 10:03 AM by Blaze Khalil MD     Vitamin D 25-Hydroxy (4/12/2017) = 22.6             Chronic kidney disease (CKD) stage G2/A1, mildly decreased glomerular filtration rate (GFR) between 60-89 mL/min/1.73 square meter and albuminuria creatinine ratio less than 30 mg/g (Chronic) ICD-10-CM: N18.2  ICD-9-CM: 585.2  4/12/2017 Yes        Hypertensive heart disease without heart failure (Chronic) ICD-10-CM: I11.9  ICD-9-CM: 402.90  Unknown Yes        * (Principal)Acute ischemic stroke ([facility] ICD-10-CM: I63.9  ICD-9-CM: 434.91  4/6/2017 Yes    Overview Addendum 4/12/2017  5:37 PM by Blaze Khalil MD     Acute Ischemic Stroke (acute right basal ganglia region infarct) with residual left hemiparesis and mild cognitive-linguistic deficits             Impaired mobility and ADLs ICD-10-CM: Z74.09  ICD-9-CM: 799.89  4/6/2017 Yes        Hemiparesis affecting left side as late effect of cerebrovascular accident (CVA) ([facility] ICD-10-CM: X02.424  ICD-9-CM: 438.20  4/6/2017 Yes        Dyslipidemia with low high density lipoprotein (HDL) cholesterol with hypertriglyceridemia due to type 2 diabetes mellitus (HCC) (Chronic) ICD-10-CM: E11.69, E78.6, E78.1  ICD-9-CM: 250.80, 272.4  4/6/2017 Yes    Overview Signed 4/12/2017 10:10 AM by Blaze Khalil MD     Lipid profile (4/12/2017): , , HDL 29, LDL 55             Type 2 diabetes mellitus with stage 2 chronic kidney disease ([facility] ICD-10-CM: E11.22, N18.2  ICD-9-CM: 250.40, 585.2  4/6/2017 Yes    Overview Signed 4/13/2017  1:51 PM by Pepito Kendall MD     HbA1c (4/12/2017) = 10.5                   Background:   Past Medical History:   Past Medical History:   Diagnosis Date    Acute ischemic stroke ([facility] 04/06/2017    Acute Ischemic Stroke (acute right basal ganglia region infarct) with residual left hemiparesis and mild cognitive-linguistic deficits    Chronic kidney disease (CKD) stage G2/A1, mildly decreased glomerular filtration rate (GFR) between 60-89 mL/min/1.73 square meter and albuminuria creatinine ratio less than 30 mg/g 8/01/8996    Diastolic dysfunction without heart failure     Dyslipidemia with low high density lipoprotein (HDL) cholesterol with hypertriglyceridemia due to type 2 diabetes mellitus ([facility] 4/6/2017    Lipid profile (4/12/2017): , , HDL 29, LDL 55    Hemiparesis affecting left side as late effect of cerebrovascular accident (CVA) ([facility] 4/6/2017    Obesity, Class I, BMI 30-34.9     Type 2 diabetes mellitus with stage 2 chronic kidney disease ([facility] 04/06/2017    HbA1c (4/12/2017) = 10.5    Vitamin D insufficiency 4/12/2017    Vitamin D 25-Hydroxy (4/12/2017) = 22.6      Patient taking anticoagulants  yes   Patient has a defibrillator: no     Assessment:   Changes in Assessment throughout shift: no     Patient has central line: no Reasons if yes: Insertion date: Last dressing date:   Patient has Pearce Cath: no Reasons if yes:     Insertion date:     Last Vitals:     Vitals:    04/15/17 0544 04/15/17 0800 04/15/17 1609 04/15/17 1930   BP: 125/75 136/83 (!) 142/95 (!) 142/93   Pulse: 67 76 76 79   Resp:  18 18 18   Temp:  97.2 °F (36.2 °C) 97.3 °F (36.3 °C) 97.7 °F (36.5 °C)   SpO2:  100% 99% 98%   Weight:       Height:            PAIN    Pain Assessment    Pain Intensity 1: 0 (04/16/17 6837) Pain Intensity 1: 2 (12/29/14 1105)      Pain Location 1: Abdomen      Pain Intervention(s) 1: Medication (see MAR)  Patient Stated Pain Goal: 0 Patient Stated Pain Goal: 0  o Intervention effective:  yes   o Other actions taken for pain: No complaints of pain     Skin Assessment  Skin color    Condition/Temperature    Integrity    Turgor    Weekly Pressure Ulcer Documentation Weekly Pressure Ulcer Documentation: No Pressure Ulcer Noted-Pressure Ulcer Prevention Initiated  Wound Prevention & Protection Methods  Orientation of wound Orientation of Wound Prevention: Posterior  Location of Prevention Location of Wound Prevention: Sacrum/Coccyx  Dressing Present Dressing Present : No  Dressing Status    Wound Offloading Wound Offloading (Prevention Methods): Bed, pressure redistribution/air     INTAKE/OUPUT    Date 04/15/17 0700 - 04/16/17 0659 04/16/17 0700 - 04/17/17 0659   Shift 8386-8751 6606-0723 24 Hour Total 5402-1123 1687-3409 24 Hour Total   I  N  T  A  K  E   P. O. 640  640         P. O. 640  640       Shift Total  (mL/kg) 640  (7)  640  (7)      O  U  T  P  U  T   Urine  (mL/kg/hr)            Urine Occurrence(s) 5 x 0 x 5 x       Stool            Stool Occurrence(s) 3 x 0 x 3 x       Shift Total  (mL/kg)           640      Weight (kg) 91.1 91.1 91.1 91.1 91.1 91.1       Recommendations:  1. Patient needs and requests: none at this time    2. Diet: diabetic consistent regular    3. Pending tests/procedures: am labs     4. Functional Level/Equipment: assist x 1/whellchair    5. Estimated Discharge Date: tbd Posted on Whiteboard in Patients Room: [facility] Safety Check    A safety check occurred in the patient's room between off going nurse and oncoming nurse listed above.     The safety check included the below items  Area Items   H  High Alert Medications - Verify all high alert medication drips (heparin, PCA, etc.)   E  Equipment - Suction is set up for ALL patients (with yanker)  - Red plugs utilized for all equipment (IV pumps, etc.)  - WOWs wiped down at end of shift.  - Room stocked with oxygen, suction, and other unit-specific supplies   A  Alarms - Bed alarm is set for fall risk patients  - Ensure chair alarm is in place and activated if patient is up in a chair   L  Lines - Check IV for any infiltration  - Pearce bag is empty if patient has a Pearce   - Tubing and IV bags are labeled   S  Safety   - Room is clean, patient is clean, and equipment is clean. - Hallways are clear from equipment besides carts. - Fall bracelet on for fall risk patients  - Ensure room is clear and free of clutter  - Suction is set up for ALL patients (with gonzalez)  - Hallways are clear from equipment besides carts.    - Isolation precautions followed, supplies available outside room, sign posted

## 2017-04-16 NOTE — PROGRESS NOTES
[facility] PHYSICAL REHABILITATION  [address] [place] [address], [address]     INPATIENT REHABILITATION  DAILY PROGRESS NOTE     Date: 4/16/2017    Name: Stewart Hanson Age / Sex: 36 y.o. / male   CSN: [id number] MRN: [id number]   [facility] Date: 4/11/2017 Length of Stay: 5 days     Primary Rehab Diagnosis: Impaired Mobility and ADLs secondary to Acute Ischemic Stroke (acute right basal ganglia region infarct) with residual left hemiparesis and mild cognitive-linguistic deficits      Subjective:     No new issues or problems reported. Blood pressure controlled. Blood sugars controlled (off Levemir).        Objective:     Vital Signs:  Patient Vitals for the past 24 hrs:   BP Temp Pulse Resp SpO2   04/16/17 0730 129/81 97.6 °F (36.4 °C) 75 16 97 %   04/16/17 0558 138/80 - 77 - -   04/15/17 1930 (!) 142/93 97.7 °F (36.5 °C) 79 18 98 %   04/15/17 1609 (!) 142/95 97.3 °F (36.3 °C) 76 18 99 %        Current Medications:  Current Facility-Administered Medications   Medication Dose Route Frequency    cholecalciferol (VITAMIN D3) tablet 2,000 Units  2,000 Units Oral DAILY    lisinopril (PRINIVIL, ZESTRIL) tablet 40 mg  40 mg Oral DAILY    omega-3 acid ethyl esters (LOVAZA) capsule 1,000 mg  1 g Oral BID WITH MEALS    glipiZIDE (GLUCOTROL) tablet 5 mg  5 mg Oral ACB    labetalol (NORMODYNE) tablet 100 mg  100 mg Oral Q12H    acetaminophen (TYLENOL) tablet 650 mg  650 mg Oral Q4H PRN    bisacodyl (DULCOLAX) tablet 10 mg  10 mg Oral Q48H PRN    heparin (porcine) injection 5,000 Units  5,000 Units SubCUTAneous Q8H    insulin lispro (HUMALOG) injection   SubCUTAneous TIDAC    aspirin (ASPIRIN) tablet 325 mg  325 mg Oral DAILY WITH BREAKFAST    atorvastatin (LIPITOR) tablet 40 mg  40 mg Oral QHS    amLODIPine (NORVASC) tablet 10 mg  10 mg Oral DAILY    metFORMIN (GLUCOPHAGE) tablet 500 mg  500 mg Oral BID WITH MEALS       Allergies:  No Known Allergies      Lab/Data Review:  Recent Results (from the past 24 hour(s))   GLUCOSE, POC    Collection Time: 04/15/17  5:11 PM   Result Value Ref Range    Glucose (POC) 98 70 - 110 mg/dL   GLUCOSE, POC    Collection Time: 04/15/17  9:04 PM   Result Value Ref Range    Glucose (POC) 101 70 - 110 mg/dL   GLUCOSE, POC    Collection Time: 04/16/17  8:11 AM   Result Value Ref Range    Glucose (POC) 109 70 - 110 mg/dL   GLUCOSE, POC    Collection Time: 04/16/17 11:59 AM   Result Value Ref Range    Glucose (POC) 127 (H) 70 - 110 mg/dL       Estimated Glomerular Filtration Rate:  On admission, based on a Creatinine of 1.18 mg/dl:   Estimated GFR using CKD-EPI = 75.1 mL/min/1.73m2   Estimated GFR using MDRD = 71.6 mL/min/1.73m2      Assessment:     Primary Rehabilitation Diagnosis  1. Impaired Mobility and ADLs  2. Acute Ischemic Stroke (acute right basal ganglia region infarct) with residual left hemiparesis and mild cognitive-linguistic deficits     Comorbidities   Hypertensive heart disease without heart failure    Diastolic dysfunction without heart failure    Dyslipidemia with low high density lipoprotein (HDL) cholesterol with hypertriglyceridemia due to type 2 diabetes mellitus    Chronic kidney disease (CKD) stage G2/A1, mildly decreased glomerular filtration rate (GFR) between 60-89 mL/min/1.73 square meter and albuminuria creatinine ratio less than 30 mg/g    Type 2 diabetes mellitus with stage 2 chronic kidney disease     Obesity, Class I, BMI 30-34.9    Vitamin D insufficiency       Plan:     1. Justification for continued stay: Good progression towards established rehabilitation goals. 2. Medical Issues being followed closely:    [x]  Fall and safety precautions     []  Wound Care     [x]  Bowel and Bladder Function     [x]  Fluid Electrolyte and Nutrition Balance     []  Pain Control      3.  Issues that 24 hour rehabilitation nursing is following:    [x]  Fall and safety precautions     []  Wound Care     [x]  Bowel and Bladder Function     [x]  Fluid Electrolyte and Nutrition Balance     []  Pain Control      [x]  Assistance with and education on in-room safety with transfers to and from the bed, wheelchair, toilet and shower. 4. Acute rehabilitation plan of care:    [x]  Continue current care and rehab. [x]  Physical Therapy           [x]  Occupational Therapy           [x]  Speech Therapy     []  Hold Rehab until further notice     5. Medications:    [x]  MAR Reviewed     [x]  Continue Present Medications     6. DVT Prophylaxis:      []  Lovenox     [x]  Unfractionated Heparin     []  Coumadin     []  Xarelto     [x]  MARY Stockings     []  Sequential Compression Device     []  None     7.  Orders:   > Acute Ischemic Stroke (acute right basal ganglia region infarct) with residual left hemiparesis and mild cognitive-linguistic deficits   > Continue:    > Aspirin 325 mg PO once daily with breakfast    > Atorvastatin 40 mg PO q HS     > Dyslipidemia with low high density lipoprotein (HDL) cholesterol with hypertriglyceridemia   > Patient was started on Atorvastatin 40 mg PO q HS at [facility]    > Lipid profile (4/12/2017): , , HDL 29, LDL 55   > On 4/12/2017, added Lovaza 1 gram PO BID   > Continue:    > Atorvastatin 40 mg PO q HS     > Lovaza 1 gram PO BID    > Hypertensive heart disease without heart failure   > Upon admission to the ARU:    > Discontinued Hydrochlorothiazide 25 mg PO once daily    > Increased Amlodipine from 5 mg to 10 mg PO once daily (6AM)    > Increased Lisinopril from 20 mg to 40 mg PO once daily (9AM)    > Added Hydralazine 25 mg PO TID PRN for SBP greater than 150 mmHg or DBP greater than 100 mmHg   > On 4/12/2017, added Labetalol 100 mg PO q 12 hr (9AM, 9PM)   > On 4/14/2017, discontinued Hydralazine 25 mg PO TID PRN for SBP greater than 150 mmHg or DBP greater than 100 mmHg   > Continue:    > Amlodipine 10 mg PO once daily (6AM)    > Lisinopril 40 mg PO once daily (9AM)    > Labetalol 100 mg PO q 12 hr (9AM, 9PM)       > Type 2 diabetes mellitus, newly diagnosed, with chronic kidney disease stage G2/A1   > HbA1c (4/12/2017) = 10.5   > Upon admission to the ARU:    > Added Metformin 500 mg PO BID with meals    > Decreased Levemir from 18 units to 15 units SC q HS   > Microalbumin/Creatinine ratio(4/12/2017) = 10 mg/g   > On 4/13/2017, added Glipizide 5 mg PO once daily before breakfast   > On 4/14/2017, decreased Levemir from 15 units to 10 units SC q HS   > On 4/15/2017, discontinued Levemir 10 units SC q HS   > Continue:    > Metformin 500 mg PO BID with meals    > Glipizide 5 mg PO once daily before breakfast    > Humalog insulin sliding scale SC TID AC only     > Vitamin D Insufficiency   > Vitamin D 25-Hydroxy (4/12/2017) = 22.6   > Patient was given Cholecalciferol 50,000 units PO x 1 dose today   > On 4/13/2017, patient was started on Cholecalciferol 2,000 units PO once daily     > Continue Cholecalciferol 2,000 units PO once daily        8. Discharge Planning:   > For discharge to home on Tuesday (4/18/2017)   > Outpatient physical therapy, occupational therapy and speech therapy   > F/U: 1. PCP (Dr. Steve Bowers)    2.  Neurology (Dr. Cy Mayorga)      Signed:    Salomon Arroyo MD    April 16, 2017

## 2017-04-16 NOTE — PROGRESS NOTES
Assumed care; Pt resting in bed; no distress noted; Pt denies pain; bed in low position; Side rails up x 2; Call light and personal belonging within reach. Will continue to monitor.

## 2017-04-17 LAB
ANION GAP BLD CALC-SCNC: 10 MMOL/L (ref 3–18)
BUN SERPL-MCNC: 13 MG/DL (ref 7–18)
BUN/CREAT SERPL: 12 (ref 12–20)
CALCIUM SERPL-MCNC: 8.8 MG/DL (ref 8.5–10.1)
CHLORIDE SERPL-SCNC: 106 MMOL/L (ref 100–108)
CO2 SERPL-SCNC: 26 MMOL/L (ref 21–32)
CREAT SERPL-MCNC: 1.08 MG/DL (ref 0.6–1.3)
GLUCOSE BLD STRIP.AUTO-MCNC: 108 MG/DL (ref 70–110)
GLUCOSE BLD STRIP.AUTO-MCNC: 118 MG/DL (ref 70–110)
GLUCOSE BLD STRIP.AUTO-MCNC: 86 MG/DL (ref 70–110)
GLUCOSE BLD STRIP.AUTO-MCNC: 96 MG/DL (ref 70–110)
GLUCOSE SERPL-MCNC: 123 MG/DL (ref 74–99)
HCT VFR BLD AUTO: 38.4 % (ref 36–48)
HGB BLD-MCNC: 13.2 G/DL (ref 13–16)
PLATELET # BLD AUTO: 232 K/UL (ref 135–420)
POTASSIUM SERPL-SCNC: 4.2 MMOL/L (ref 3.5–5.5)
SODIUM SERPL-SCNC: 142 MMOL/L (ref 136–145)

## 2017-04-17 PROCEDURE — 82962 GLUCOSE BLOOD TEST: CPT

## 2017-04-17 PROCEDURE — 97110 THERAPEUTIC EXERCISES: CPT

## 2017-04-17 PROCEDURE — 85049 AUTOMATED PLATELET COUNT: CPT | Performed by: INTERNAL MEDICINE

## 2017-04-17 PROCEDURE — 65310000000 HC RM PRIVATE REHAB

## 2017-04-17 PROCEDURE — 97535 SELF CARE MNGMENT TRAINING: CPT

## 2017-04-17 PROCEDURE — 97150 GROUP THERAPEUTIC PROCEDURES: CPT

## 2017-04-17 PROCEDURE — 85018 HEMOGLOBIN: CPT | Performed by: INTERNAL MEDICINE

## 2017-04-17 PROCEDURE — 74011250636 HC RX REV CODE- 250/636: Performed by: INTERNAL MEDICINE

## 2017-04-17 PROCEDURE — 36415 COLL VENOUS BLD VENIPUNCTURE: CPT | Performed by: INTERNAL MEDICINE

## 2017-04-17 PROCEDURE — 92507 TX SP LANG VOICE COMM INDIV: CPT

## 2017-04-17 PROCEDURE — 80048 BASIC METABOLIC PNL TOTAL CA: CPT | Performed by: INTERNAL MEDICINE

## 2017-04-17 PROCEDURE — 74011250637 HC RX REV CODE- 250/637: Performed by: INTERNAL MEDICINE

## 2017-04-17 RX ORDER — MELATONIN
2000 DAILY
Qty: 60 TAB | Refills: 0 | Status: SHIPPED | OUTPATIENT
Start: 2017-04-17

## 2017-04-17 RX ORDER — ASPIRIN 325 MG
325 TABLET ORAL
Qty: 30 TAB | Refills: 0 | Status: SHIPPED | OUTPATIENT
Start: 2017-04-17

## 2017-04-17 RX ORDER — LISINOPRIL 40 MG/1
40 TABLET ORAL DAILY
Qty: 30 TAB | Refills: 0 | Status: SHIPPED | OUTPATIENT
Start: 2017-04-17

## 2017-04-17 RX ORDER — OMEGA-3-ACID ETHYL ESTERS 1 G/1
1 CAPSULE, LIQUID FILLED ORAL 2 TIMES DAILY WITH MEALS
Qty: 60 CAP | Refills: 0 | Status: SHIPPED | OUTPATIENT
Start: 2017-04-17

## 2017-04-17 RX ORDER — GLIPIZIDE 5 MG/1
5 TABLET ORAL
Qty: 30 TAB | Refills: 0 | Status: SHIPPED | OUTPATIENT
Start: 2017-04-17

## 2017-04-17 RX ORDER — LABETALOL 100 MG/1
100 TABLET, FILM COATED ORAL EVERY 12 HOURS
Qty: 60 TAB | Refills: 0 | Status: SHIPPED | OUTPATIENT
Start: 2017-04-17

## 2017-04-17 RX ORDER — METFORMIN HYDROCHLORIDE 500 MG/1
500 TABLET ORAL 2 TIMES DAILY WITH MEALS
Qty: 60 TAB | Refills: 0 | Status: SHIPPED | OUTPATIENT
Start: 2017-04-17

## 2017-04-17 RX ORDER — ATORVASTATIN CALCIUM 40 MG/1
40 TABLET, FILM COATED ORAL
Qty: 30 TAB | Refills: 0 | Status: SHIPPED | OUTPATIENT
Start: 2017-04-17

## 2017-04-17 RX ORDER — AMLODIPINE BESYLATE 10 MG/1
10 TABLET ORAL DAILY
Qty: 30 TAB | Refills: 0 | Status: SHIPPED | OUTPATIENT
Start: 2017-04-17

## 2017-04-17 RX ADMIN — ASPIRIN 325 MG ORAL TABLET 325 MG: 325 PILL ORAL at 08:31

## 2017-04-17 RX ADMIN — GLIPIZIDE 5 MG: 5 TABLET ORAL at 06:32

## 2017-04-17 RX ADMIN — METFORMIN HYDROCHLORIDE 500 MG: 500 TABLET, FILM COATED ORAL at 18:00

## 2017-04-17 RX ADMIN — HEPARIN SODIUM 5000 UNITS: 5000 INJECTION, SOLUTION INTRAVENOUS; SUBCUTANEOUS at 15:03

## 2017-04-17 RX ADMIN — HEPARIN SODIUM 5000 UNITS: 5000 INJECTION, SOLUTION INTRAVENOUS; SUBCUTANEOUS at 06:33

## 2017-04-17 RX ADMIN — VITAMIN D, TAB 1000IU (100/BT) 2000 UNITS: 25 TAB at 08:32

## 2017-04-17 RX ADMIN — LABETALOL HYDROCHLORIDE 100 MG: 100 TABLET, FILM COATED ORAL at 21:21

## 2017-04-17 RX ADMIN — LABETALOL HYDROCHLORIDE 100 MG: 100 TABLET, FILM COATED ORAL at 08:32

## 2017-04-17 RX ADMIN — HEPARIN SODIUM 5000 UNITS: 5000 INJECTION, SOLUTION INTRAVENOUS; SUBCUTANEOUS at 22:03

## 2017-04-17 RX ADMIN — OMEGA-3-ACID ETHYL ESTERS 1000 MG: 900 CAPSULE, LIQUID FILLED ORAL at 08:31

## 2017-04-17 RX ADMIN — LISINOPRIL 40 MG: 20 TABLET ORAL at 08:32

## 2017-04-17 RX ADMIN — AMLODIPINE BESYLATE 10 MG: 5 TABLET ORAL at 06:28

## 2017-04-17 RX ADMIN — OMEGA-3-ACID ETHYL ESTERS 1000 MG: 900 CAPSULE, LIQUID FILLED ORAL at 18:00

## 2017-04-17 RX ADMIN — ATORVASTATIN CALCIUM 40 MG: 40 TABLET, FILM COATED ORAL at 21:22

## 2017-04-17 NOTE — REHAB NOTE
SHIFT CHANGE NOTE FOR [facility]    Bedside and Verbal shift change report given to Luan Gan, RN (oncoming nurse) by Angelia Desir RN  , RN (offgoing nurse). Report included the following information SBAR, Kardex, MAR and Recent Results.     Situation:   Code Status: Full Code   Reason for Admission: CVA  Hospital Day: 6   Problem List:   Hospital Problems  Date Reviewed: 4/16/2017          Codes Class Noted POA    Vitamin D insufficiency (Chronic) ICD-10-CM: E55.9  ICD-9-CM: 268.9  4/12/2017 Yes    Overview Signed 4/12/2017 10:03 AM by Emerald Lombardo MD     Vitamin D 25-Hydroxy (4/12/2017) = 22.6             Chronic kidney disease (CKD) stage G2/A1, mildly decreased glomerular filtration rate (GFR) between 60-89 mL/min/1.73 square meter and albuminuria creatinine ratio less than 30 mg/g (Chronic) ICD-10-CM: N18.2  ICD-9-CM: 585.2  4/12/2017 Yes        Hypertensive heart disease without heart failure (Chronic) ICD-10-CM: I11.9  ICD-9-CM: 402.90  Unknown Yes        * (Principal)Acute ischemic stroke ([facility] ICD-10-CM: I63.9  ICD-9-CM: 434.91  4/6/2017 Yes    Overview Addendum 4/12/2017  5:37 PM by Emerald Lombardo MD     Acute Ischemic Stroke (acute right basal ganglia region infarct) with residual left hemiparesis and mild cognitive-linguistic deficits             Impaired mobility and ADLs ICD-10-CM: Z74.09  ICD-9-CM: 799.89  4/6/2017 Yes        Hemiparesis affecting left side as late effect of cerebrovascular accident (CVA) ([facility] ICD-10-CM: H62.626  ICD-9-CM: 438.20  4/6/2017 Yes        Dyslipidemia with low high density lipoprotein (HDL) cholesterol with hypertriglyceridemia due to type 2 diabetes mellitus (HCC) (Chronic) ICD-10-CM: E11.69, E78.6, E78.1  ICD-9-CM: 250.80, 272.4  4/6/2017 Yes    Overview Signed 4/12/2017 10:10 AM by Emerald Lombardo MD     Lipid profile (4/12/2017): , , HDL 29, LDL 55             Type 2 diabetes mellitus with stage 2 chronic kidney disease ([facility] ICD-10-CM: E11.22, N18.2  ICD-9-CM: 250.40, 585.2  4/6/2017 Yes    Overview Signed 4/13/2017  1:51 PM by Gisel Oleary MD     HbA1c (4/12/2017) = 10.5                   Background:   Past Medical History:   Past Medical History:   Diagnosis Date    Acute ischemic stroke ([facility] 04/06/2017    Acute Ischemic Stroke (acute right basal ganglia region infarct) with residual left hemiparesis and mild cognitive-linguistic deficits    Chronic kidney disease (CKD) stage G2/A1, mildly decreased glomerular filtration rate (GFR) between 60-89 mL/min/1.73 square meter and albuminuria creatinine ratio less than 30 mg/g 9/99/3785    Diastolic dysfunction without heart failure     Dyslipidemia with low high density lipoprotein (HDL) cholesterol with hypertriglyceridemia due to type 2 diabetes mellitus ([facility] 4/6/2017    Lipid profile (4/12/2017): , , HDL 29, LDL 55    Hemiparesis affecting left side as late effect of cerebrovascular accident (CVA) ([facility] 4/6/2017    Obesity, Class I, BMI 30-34.9     Type 2 diabetes mellitus with stage 2 chronic kidney disease ([facility] 04/06/2017    HbA1c (4/12/2017) = 10.5    Vitamin D insufficiency 4/12/2017    Vitamin D 25-Hydroxy (4/12/2017) = 22.6      Patient taking anticoagulants  yes   Patient has a defibrillator: no     Assessment:   Changes in Assessment throughout shift: no     Patient has central line: no Reasons if yes: Insertion date: Last dressing date:   Patient has Pearce Cath: no Reasons if yes:     Insertion date:     Last Vitals:     Vitals:    04/16/17 0558 04/16/17 0730 04/16/17 1600 04/16/17 1930   BP: 138/80 129/81 135/88 (!) 154/98   Pulse: 77 75 62 80   Resp:  16 16 16   Temp:  97.6 °F (36.4 °C) 97 °F (36.1 °C) 99.1 °F (37.3 °C)   SpO2:  97% 99% 98%   Weight:       Height:            PAIN    Pain Assessment    Pain Intensity 1: 0 (04/17/17 0400) Pain Intensity 1: 2 (12/29/14 1105)      Pain Location 1: Abdomen      Pain Intervention(s) 1: Medication (see MAR)  Patient Stated Pain Goal: 0 Patient Stated Pain Goal: 0  o Intervention effective:  yes   o Other actions taken for pain: No complaints of pain     Skin Assessment  Skin color    Condition/Temperature    Integrity    Turgor    Weekly Pressure Ulcer Documentation Weekly Pressure Ulcer Documentation: No Pressure Ulcer Noted-Pressure Ulcer Prevention Initiated  Wound Prevention & Protection Methods  Orientation of wound Orientation of Wound Prevention: Posterior  Location of Prevention Location of Wound Prevention: Sacrum/Coccyx  Dressing Present Dressing Present : No  Dressing Status    Wound Offloading Wound Offloading (Prevention Methods): Bed, pressure reduction mattress     INTAKE/OUPUT    Date 04/16/17 0700 - 04/17/17 0659 04/17/17 0700 - 04/18/17 0659   Shift 5745-1146 8461-5883 24 Hour Total 7204-3799 0072-5196 24 Hour Total   I  N  T  A  K  E   P. O. 720  720         P. O. 720  720       Shift Total  (mL/kg) 720  (7.9)  720  (7.9)      O  U  T  P  U  T   Urine  (mL/kg/hr)            Urine Occurrence(s) 6 x 0 x 6 x       Stool            Stool Occurrence(s) 0 x 0 x 0 x       Shift Total  (mL/kg)           720      Weight (kg) 91.1 91.1 91.1 91.1 91.1 91.1       Recommendations:  1. Patient needs and requests: none at this time    2. Diet: diabetic consistent regular    3. Pending tests/procedures: am labs     4. Functional Level/Equipment: assist x 1/whellchair    5. Estimated Discharge Date: tbd Posted on Whiteboard in Patients Room: no     [facility] Safety Check    A safety check occurred in the patient's room between off going nurse and oncoming nurse listed above.     The safety check included the below items  Area Items   H  High Alert Medications - Verify all high alert medication drips (heparin, PCA, etc.)   E  Equipment - Suction is set up for ALL patients (with yanker)  - Red plugs utilized for all equipment (IV pumps, etc.)  - WOWs wiped down at end of shift.  - Room stocked with oxygen, suction, and other unit-specific supplies   A  Alarms - Bed alarm is set for fall risk patients  - Ensure chair alarm is in place and activated if patient is up in a chair   L  Lines - Check IV for any infiltration  - Pearce bag is empty if patient has a Pearce   - Tubing and IV bags are labeled   S  Safety   - Room is clean, patient is clean, and equipment is clean. - Hallways are clear from equipment besides carts. - Fall bracelet on for fall risk patients  - Ensure room is clear and free of clutter  - Suction is set up for ALL patients (with gonzalez)  - Hallways are clear from equipment besides carts.    - Isolation precautions followed, supplies available outside room, sign posted

## 2017-04-17 NOTE — PROGRESS NOTES
Left voice mail for Sudhir Ford (Nurse Reviewer with [facility]) to notify of plan for patients discharge to take place Tuesday, April 18, 2017 following overnight stay in the [facility] apartment on 4/17/17.       Bereket Melendez, Clinical Liaison (MSW coverage)

## 2017-04-17 NOTE — DIABETES MGMT
Glycemic Control Plan of Care    Assessment/Recommendations:  Patient is 36year old with past medical history of hypertension - was admitted to acute rehab on 04/11/2017 status post ischemic stroke. Noted:  Ischemic stroke with residual left hemiparesis. Dyslipidemia. Obesity. New diagnosis of type 2 diabetes mellitus with current A1C of 10.5% (04/12/2017). POC BG range on 04/16/2017: 109-127 mg/dL. POC BG report on 04/17/2017 at time of review: 86 mg/dL. Seen patient today and completed diabetes education. He is able to ambulate around room but has residual left arm/hand weakness. His supportive sister arrived and stated that she can assist with diabetes care post discharge. Diabetes education included BG monitoring and insulin adm. Patient stated that he is hoping not to require insulin but he will accept insulin if recommended. Completed both vial and pen insulin training. Patient is hoping that he will be prescribed pen insulin, if decided, instead of vial insulin due to left arm/hand weakness. Noted that his sister arrived with iced tea and artificial sweetener. Patient acknowledged that he will need to use artificial sweetener instead of regular sugar. Recommendations: Changed correctional lispro insulin to HR order set. Continue monitoring and correctional lispro insulin. Most recent blood glucose values:    Results for Jasson Jefferson (MRN [id number]) as of 4/17/2017 10:23   Ref. Range 4/16/2017 08:11 4/16/2017 11:59 4/16/2017 17:38 4/16/2017 21:32   GLUCOSE,FAST - POC Latest Ref Range: 70 - 110 mg/dL 109 127 (H) 113 (H) 123 (H)     Results for Jasson Jefferson (MRN [id number]) as of 4/17/2017 10:23   Ref. Range 4/17/2017 08:36   GLUCOSE,FAST - POC Latest Ref Range: 70 - 110 mg/dL 86     Current A1C: 10.5% (04/12/2017) is equivalent to average blood glucose of 255 mg during the past 2-3 months. Current hospital diabetes medications:  Correctional lispro insulin ACHS.  Normal sensitivity dose. Total daily dose insulin requirement previous day: 04/16/2017  Lispro: None. Patient did not require insulin coverage. Home diabetes medications: None. Patient admitted with newly diagnosed diabetes mellitus. Diet: Diabetic consistent carb; regular; no concentrated sweets.     Goals:  Blood glucose will be within target range of  mg/dL by 04/20/2017    Education:  _X__  Refer to Diabetes Education Record: 04/17/2017             ___  Education not indicated at this time    Niko Boswell RN

## 2017-04-17 NOTE — CONSULTS
ARU PSYCHOLOGICAL SCREENING    Assessment Initiated:  April 17, 2017    Rehab Diagnosis:  Right CVA with Left Side Weakness    Pertinent Physical/Psychiatric History:     Patient Active Problem List   Diagnosis Code    Hypertensive heart disease without heart failure B48.7    Diastolic dysfunction without heart failure I51.9    Acute ischemic stroke (HCC) I63.9    Impaired mobility and ADLs Z74.09    Hemiparesis affecting left side as late effect of cerebrovascular accident (CVA) ([facility] [address] J38.445    Dyslipidemia with low high density lipoprotein (HDL) cholesterol with hypertriglyceridemia due to type 2 diabetes mellitus (HCC) E11.69, E78.6, E78.1    Obesity, Class I, BMI 30-34.9 E66.9    Vitamin D insufficiency E55.9    Chronic kidney disease (CKD) stage G2/A1, mildly decreased glomerular filtration rate (GFR) between 60-89 mL/min/1.73 square meter and albuminuria creatinine ratio less than 30 mg/g N18.2    Type 2 diabetes mellitus with stage 2 chronic kidney disease (HCC) E11.22, N18.2       Patient denies history of psychiatric services and is not currently requiring psychotropic medication for stabilization. Patient further denies any history of substance abuse nor dependence. OBJECTIVE  GENERAL OBSERVATIONS  Willingness to participate in program: [x] good   [] fair [] indifferent [] poor    General Appearance:  Patient observed casually and appropriately dressed and groomed and sitting comfortably in reclining chair in room. Sensory Impairments:  Speech is spontaneous and intelligible and he appears to have satisfactory auditory reception and comprehension. He displays evidence of left side weakness but movement in all extremities.      [place] Affiliation:  [facility]    Admission Assessment  Discharge Status   [x] alert  [] lethargic  [] difficult to arouse  [] fluctuating  [] other: Level of Consciousness [] alert  [] lethargic  [] difficult to arouse  [] fluctuating  [] other:   [x] person  [x] place  [x] time  [x] situation Oriented [] person  [] place  [] time  [] situation   [x] within normal limits  [] impaired       [] mild        [] moderate        [] severe Attention [] within normal limits  [] impaired       [] mild        [] moderate        [] severe   [x] within normal limits  [] impaired       [] mild        [] moderate        [] severe Memory [] within normal limits  [] impaired       [] mild        [] moderate        [] severe   [x] appropriate to situation  [] depressed  [x] anxious  [] angry   [x] fearful  [] emotionally labile  [] other:  Mood [] appropriate to situation  [] depressed  [] anxious  [] angry   [] fearful  [] emotionally labile  [] other:   [x] appropriate  [] flat  [] inappropriate to content of speech Affect [] appropriate  [] flat  [] inappropriate to content of speech   [x] appropriate  [] aggressive/agitated  [] withdrawn  [] inappropriate  [] other: Behavior [] appropriate  [] aggressive/agitated  [] withdrawn  [] inappropriate  [] other:   [] good  [x] limited  [] denial  [] none Insight Into Illness [] good  [] limited  [] denial  [] none   [x] intact  [] impaired       [] mild        [] moderate        [] severe       Describe:  Cognition [] intact  [] impaired       [] mild        [] moderate        [] severe       Describe:    [x] coping  [] demonstrates poor adjustment  [] undetermined       As evidenced by:  Patient acknowledges stress feelings with second stroke occurrence. Patient Adjustment to Disability [] coping  [] demonstrates poor adjustment  [] undetermined       As evidenced by:    [x] coping  [] demonstrates poor adjustment  [] undetermined      As evidenced by:  Good support evidenced with sister present.  Family Adjustment to Disability [] coping  [] demonstrates poor adjustment  [] undetermined      As evidenced by:      ASSESSMENT  Clinical Impression:  Patient is a 36year old, single, having two children (ages 25 and 21 years), employed at [employer], [address] male; he resides in [place], [place] in one story trailer with four steps to enter and having bilateral hand rails. Patient reportedly has incurred a second stroke and seems to blame himself for poor health care management prior to this occurrence. He has therefore been provided with information about risk for stroke recurrence and strongly encouraged to be more proactive in his follow through with risk reduction, post hospital.  At this time, patient is near his discharge to home. Parenthetically, his particular risks are noteworthy, including hypertension and now diabetes, which will certainly demand that he be more attuned to his healthcare needs. Emotionally, while patient does not report feeling acute emotional distress and no lability is observed, he is nonetheless pensive and seems preoccupied about the status of health and the fact that he has now had another CVA. Patient was therefore educated about the efficacy of not only psychotropic medication as an adjunct to emotional stability, but also therapy services which might provide him with more avenues to ensure health stability. Cognitively, patient observed alert and oriented and with satisfactory auditory reception and comprehension and able to understand and follow any direction. In fact, he is observed independent in mobility on the ARU. Patient will not have difficulty in following through with safety and pace issues on his return to home. While safety and pace were reinforced to him, he understands that he must take time to recover and, for instance, not rush to continue driving his automobile until he has been \"cleared. \"  Fortunately, while patient is concerned about return to work issues, he is aware that he has been offered short term disability by his employer, [employer] Patient Strengths:  Alert, oriented, pleasant, cooperative and motivated to improve.     Patient Preferences:  Return to daily routine with greater strength and endurance, as well as confidence. Rehab Potential:  Good    Educational Needs: Under each heading list the specific items in which the patient or family will need education/training. Example: hip precautions, use of walker, ADL equipment, neglect, judgment, adjustment, etc.     Special considerations or accommodations for teaching:  [x] Yes     [] No     [] NA  If Yes, explain: Patient has been educated about health care management and risk reduction. Discharge Status    Completed Demonstrated/ Verbalized Understanding    Yes No Yes No   Info regarding disability:  [] [] [] []   Adjustment:  [] [] [] []   Cognition:  [] [] [] []   Other: [] [] [] []   Other: [] [] [] []   If education not completed, explain: [] [] [] []     PLAN  Problem: Safety and pace in post hospital recovery  Long Term Goal: Recognize need for same  Intervention: Educate and reinfroce  At Discharge  LTG Achieved: [] Yes [] No If not achieved, explain:    Problem: Underlying stress  Long Term Goal: Minimize subjective stress  Intervention: Reinforcement of stress reduction with subjective gains in recovery  At Discharge  LTG Achieved: [] Yes [] No If not achieved, explain:    Problem:   Long Term Goal:   Intervention:   At Discharge  LTG Achieved: [] Yes [] No If not achieved, explain:    Problem:   Long Term Goal:   Intervention:   At Discharge  LTG Achieved: [] Yes [] No If not achieved, explain:    Problem:   Long Term Goal:   Intervention:   At Discharge  LTG Achieved: [] Yes [] No If not achieved, explain:    Caryl Anderson, [facility]  4/17/2017 3:35 PM    DISCHARGE STATUS    Clinical Impressions: Patient discharged to home with home health care ordered for him. He was strongly encouraged to be more proactive in his healthcare management.     Follow-up Services Recommended Purpose                 Caryl Anderson, PHD  Discharge Date/Time:

## 2017-04-17 NOTE — PROGRESS NOTES
Problem: Self Care Deficits Care Plan (Adult)  Goal: *Acute Goals and Plan of Care (Insert Text)  Short Term Goals = Long Term Goals (to be met upon discharge date) in order to increase pts functional independence and safety, and decrease burden of care:  1. Pt will perform self-feeding with independence. 2. Pt will perform grooming with independence. 3. Pt will perform UB bathing with modified independence. 4. Pt will perform LB bathing with modified independence. 5. Pt will perform tub/shower transfer with modified independence. 6. Pt will perform UB dressing with independence. 7. Pt will perform LB dressing with modified independence. 8. Pt will perform toileting task with modified independence. 9. Pt will perform toilet transfer with modified independence. 10. Pt will perform an IADL task with modified independence. OCCUPATIONAL THERAPY DISCHARGE SUMMARY  Patient Name:Juan Leon  Time Spent With Patient  Time In: 1100  Time Out: 1200      Time In: 1430  Time Out: 1500     Pain at start of tx: 0/10  Pain at stop of tx: 0/10     Patient identified with name and : yes  Objective: AM Session: Pt performed full body shower; see below for ADL and functional transfer levels. He also participated in L hand strengthening exercises to include small bead retrieval utilizing blue theraputty. He also performed single digit flexion utilizing 5# digiflex. Pt completed keyhole peg placement with in-hand manipulation to orient pegs prior to placing them in pegboard. PM Session: Pt participated in a 30-minute group treatment focused on standing tolerance and functional use of LUE. He remained standing with independence for duration of the treatment session while playing \"Rhett. \" Pt maintained hold of cards in his R hand and utilized L hand for reaching to draw and play cards throughout the game with intermittent increased time required for manipulation of the cards throughout the game.      Problem List: Decreased strength B UE  [X]     Decreased strength trunk/core  [X]     Decreased AROM   [X]     Decreased PROM  [ ]     Decreased balance sitting  [ ]     Decreased balance standing  [X]     Decreased endurance  [X]     Pain  [ ]        Functional Limitations:   Decreased independence with ADL  [X]     Decreased independence with functional transfers  [X]     Decreased independence with ambulation  [X]     Decreased independence with IADL  [X]        Outcome Measures:                  MMT Initial Assessment    Right Upper Extremity  Left Upper Extermity    UE AROM  WNL: Grossly 4+/5  WFL; Grossly 4/5   Shoulder flexion  -  -   Shoulder extension  -  -   Shoulder ABDuction  -  -   Shoulder ADDUction  -  -   Elbow Flexion  -  -   Elbow Extension  -  -   Wrist Extension/Flexion  -  -     -  -                          MMT Discharge Assessment    Right Upper Extremity  Left Upper Extermity    UE AROM WNL; Grossly 4+/5 WFL; Grossly 4/5   Shoulder flexion - -   Shoulder extension - -   Shoulder ABDuction - -   Shoulder ADDUction - -   Elbow Flexion - -   Elbow Extension - -   Wrist Extension/Flexion - -    - -         0/5       No palpable muscle contraction  1/5       Palpable muscle contraction, no joint movement  2-/5      Less than full range of motion in gravity eliminated position  2/5       Able to complete full range of motion in gravity eliminated position  2+/5     Able to initiate movement against gravity  3-/5      More than half but not full range of motion against gravity  3/5       Able to complete full range of motion against gravity  3+/5     Completes full range of motion against gravity with minimal resistance  4-/5      Completes full range of motion against gravity with minimal-moderate resistance  4/5       Completes full range of motion against gravity with moderate resistance  4+/5     Completes full range of motion against gravity with moderate-maximum resistance  5/5       Completes full range of motion against gravity with maximum resistance     Coordination: Impaired LUE  Sensation: Intact UEs      FIM SCORES Initial Assessment Discharge Assessment   Eating 6 Feeding/Eating  Feeding/Eating Assistance: 7 (Independent)   Grooming 6 Grooming  Grooming Assistance : 7 (Independent)     Oral Hygiene FIM: 7    Bathing 5 Upper Body Bathing  Bathing Assistance, Upper: 6 (Modified independent)  Position Performed: Standing  Lower Body Bathing  Bathing Assistance, Lower : 6 (Modified independent)  Position Performed: Standing   Upper Body Dressing 7 Upper Body Dressing   Dressing Assistance : 7 (Independent)   Lower Body Dressing 5 Lower Body Dressing   Dressing Assistance : 6 (Modified independent)  Leg Crossed Method Used: Yes  Position Performed: Seated in chair;Standing     Sock and/or Shoe Management FIM: 6   Toileting 5 Toileting  Toileting Assistance (FIM Score): 6 (Modified independent)   Bladder - level of assist - -   Bladder - accident frequency score - -   Bowel - level of assist - -   Bowel - accident frequency score - -   Tub/Shower Transfer 4     Toilet Transfer 5 Functional Transfers  Toilet Transfer :  (Stand step transfer without AD)  Amount of Assistance Required: 6 (Modified independent)  Tub or Shower Type: Tub/Shower combination  Amount of Assistance Required: 6 (Modified independent)   Comprehension 6 6   Expression 6 6   Social Interaction 6 7   Problem Solving 5 6   Memory 5 6   Please see [facility] Interdisciplinary Eval: Coordination/Balance Section for details regarding FIM score description. OCCUPATIONAL THERAPY PLAN OF CARE     LTGs: Pt has achieved all LTGs. Pt would benefit from continued skilled occupational therapy in order to improve self care and functional mobility within the home with use of least restrictive device.  Interventions may include ADL training, NMRE, cognitive re-training,  range of motion (AROM, PROM B UE), motor function (B UE strengthening/coordination), activity tolerance (vitals, oxygen saturation levels),and functional transfer training. HEP handout:  Pt was provided with hand strengthening HEP to perform at home with pt verbalizing and demonstrating understanding independently. Pt to be discharged home. Therapy recommendations: Outpatient OT   Equipment recommendations: None     Please see IRC; Interdisciplinary Eval, Care Plan, and Patient Education for further information regarding occupational therapy discharge summary and plan of care.       Jomar Wilson OTNANDA  4/17/2017

## 2017-04-17 NOTE — PROGRESS NOTES
Problem: Neurolinguistics Impaired (Adult)  Goal: *Speech Goal: (INSERT TEXT)  Long term goals:  1. Patient will recall short lists of up to 5 related items with 90% accuracy. 2. Patient will perform varied functional problem solving tasks (higher level) with 90% accuracy. 3. Patient will read paragraphs and respond to content questions with % accuracy. 4. Patient will discuss commonly heard figurative language (idioms) with 90% accuracy. Short term goals (by 4/19/17):  1. Patient will recall short lists of up to 5 related items with 75-85%% accuracy. 2. Patient will perform varied functional problem solving tasks (higher level) with 75-85% accuracy. 3. Patient will read paragraphs and respond to content questions with 80-90% accuracy. 4. Patient will discuss commonly heard figurative language (idioms) with 75-85% accuracy. SPEECH LANGUAGE PATHOLOGY TREATMENT     Patient: Lopez Price (22 y.o. male)  Date: 4/17/2017  Diagnosis: CVA  [facility] Acute [facility] stroke [facility])       SUBJECTIVE:   Patient stated I saw my Dad for about 5 minutes on [address] [place] [address]. OBJECTIVE:   Mental Status:  Mr. Celia Patel was awake and alert throughout this afternoon's session. Treatment & Interventions:   Mr. Celia Patel participated in the following treatment tasks this afternoon:  Language Comprehension and Expression:   Blue items found outside:                   14 listed  Paragraph comprehension:                86% accuracy   Neuro-Linguistics:   Orientation:                                         Supervision       Recent memory:                                 Supervision  Episodic memory:                               Supervision     Response & Tolerance to Activities:  Mr. Celia Patel participated well in today's session.   He is making progress toward established goals     Pain:  Pain Scale 1: Numeric (0 - 10)     After treatment:   [X]       Patient left in no apparent distress sitting up in chair  [ ]       Patient left in no apparent distress in bed  [X]       Call bell left within reach  [ ]       Nursing notified  [ ]       Caregiver present  [ ]       Bed alarm activated      ASSESSMENT:   Mr. Cici Washington  demonstrates good participation in treatment tasks. Progression toward goals:  [X]       Improving appropriately and progressing toward goals  [ ]       Improving slowly and progressing toward goals  [ ]       Not making progress toward goals and plan of care will be adjusted      PLAN:   Patient continues to benefit from skilled intervention to address the above impairments. Continue treatment per established plan of care.   Discharge Recommendations:  Outpatient     CHASTITY Martinez  Time Calculation: 30 mins

## 2017-04-17 NOTE — PROGRESS NOTES
[facility] PHYSICAL REHABILITATION  [address] [place] [address], [address]     INPATIENT REHABILITATION  DAILY PROGRESS NOTE     Date: 4/17/2017    Name: Zahida Lou Age / Sex: 36 y.o. / male   CSN: [id number] MRN: [id number]   Admit Date: 4/11/2017 Length of Stay: 6 days     Primary Rehab Diagnosis: Impaired Mobility and ADLs secondary to Acute Ischemic Stroke (acute right basal ganglia region infarct) with residual left hemiparesis and mild cognitive-linguistic deficits      Subjective:     Patient in NAD, awake, alert    Objective:     Vital Signs:  Patient Vitals for the past 24 hrs:   BP Temp Pulse Resp SpO2   04/17/17 1639 133/82 98 °F (36.7 °C) 79 18 97 %   04/17/17 0943 (!) 132/91 97.2 °F (36.2 °C) 74 22 100 %   04/17/17 0628 120/78 - 63 - -      Exam    General:  Awake, alert  Cardiovascular:  S1S2+, RRR  Pulmonary:  CTA b/l  GI:  Soft, BS+, NT, ND  Extremities:  No edema  Left hemiparesis    Current Medications:  Current Facility-Administered Medications   Medication Dose Route Frequency    cholecalciferol (VITAMIN D3) tablet 2,000 Units  2,000 Units Oral DAILY    lisinopril (PRINIVIL, ZESTRIL) tablet 40 mg  40 mg Oral DAILY    omega-3 acid ethyl esters (LOVAZA) capsule 1,000 mg  1 g Oral BID WITH MEALS    glipiZIDE (GLUCOTROL) tablet 5 mg  5 mg Oral ACB    labetalol (NORMODYNE) tablet 100 mg  100 mg Oral Q12H    acetaminophen (TYLENOL) tablet 650 mg  650 mg Oral Q4H PRN    bisacodyl (DULCOLAX) tablet 10 mg  10 mg Oral Q48H PRN    heparin (porcine) injection 5,000 Units  5,000 Units SubCUTAneous Q8H    insulin lispro (HUMALOG) injection   SubCUTAneous TIDAC    aspirin (ASPIRIN) tablet 325 mg  325 mg Oral DAILY WITH BREAKFAST    atorvastatin (LIPITOR) tablet 40 mg  40 mg Oral QHS    amLODIPine (NORVASC) tablet 10 mg  10 mg Oral DAILY    metFORMIN (GLUCOPHAGE) tablet 500 mg  500 mg Oral BID WITH MEALS       Allergies:  No Known Allergies      Lab/Data Review:  Recent Results (from the past 24 hour(s))   GLUCOSE, POC    Collection Time: 04/16/17  9:32 PM   Result Value Ref Range    Glucose (POC) 123 (H) 70 - 752 mg/dL   METABOLIC PANEL, BASIC    Collection Time: 04/17/17  6:28 AM   Result Value Ref Range    Sodium 142 136 - 145 mmol/L    Potassium 4.2 3.5 - 5.5 mmol/L    Chloride 106 100 - 108 mmol/L    CO2 26 21 - 32 mmol/L    Anion gap 10 3.0 - 18 mmol/L    Glucose 123 (H) 74 - 99 mg/dL    BUN 13 7.0 - 18 MG/DL    Creatinine 1.08 0.6 - 1.3 MG/DL    BUN/Creatinine ratio 12 12 - 20      GFR est AA >60 >60 ml/min/1.73m2    GFR est non-AA >60 >60 ml/min/1.73m2    Calcium 8.8 8.5 - 10.1 MG/DL   HGB & HCT    Collection Time: 04/17/17  6:28 AM   Result Value Ref Range    HGB 13.2 13.0 - 16.0 g/dL    HCT 38.4 36.0 - 48.0 %   PLATELET    Collection Time: 04/17/17  6:28 AM   Result Value Ref Range    PLATELET 032 683 - 293 K/uL   GLUCOSE, POC    Collection Time: 04/17/17  8:36 AM   Result Value Ref Range    Glucose (POC) 86 70 - 110 mg/dL   GLUCOSE, POC    Collection Time: 04/17/17 12:40 PM   Result Value Ref Range    Glucose (POC) 96 70 - 110 mg/dL   GLUCOSE, POC    Collection Time: 04/17/17  5:02 PM   Result Value Ref Range    Glucose (POC) 108 70 - 110 mg/dL         Assessment:     Primary Rehabilitation Diagnosis  1. Impaired Mobility and ADLs  2.  Acute Ischemic Stroke (acute right basal ganglia region infarct) with residual left hemiparesis and mild cognitive-linguistic deficits     Comorbidities   Hypertensive heart disease without heart failure    Diastolic dysfunction without heart failure    Dyslipidemia with low high density lipoprotein (HDL) cholesterol with hypertriglyceridemia due to type 2 diabetes mellitus    Chronic kidney disease (CKD) stage G2/A1, mildly decreased glomerular filtration rate (GFR) between 60-89 mL/min/1.73 square meter and albuminuria creatinine ratio less than 30 mg/g    Type 2 diabetes mellitus with stage 2 chronic kidney disease     Obesity, Class I, BMI 30-34.9    Vitamin D insufficiency       Plan:     1. Justification for continued stay: Good progression towards established rehabilitation goals. 2. Medical Issues being followed closely:    [x]  Fall and safety precautions     []  Wound Care     [x]  Bowel and Bladder Function     [x]  Fluid Electrolyte and Nutrition Balance     []  Pain Control      3. Issues that 24 hour rehabilitation nursing is following:    [x]  Fall and safety precautions     []  Wound Care     [x]  Bowel and Bladder Function     [x]  Fluid Electrolyte and Nutrition Balance     []  Pain Control      [x]  Assistance with and education on in-room safety with transfers to and from the bed, wheelchair, toilet and shower. 4. Acute rehabilitation plan of care:    [x]  Continue current care and rehab. [x]  Physical Therapy           [x]  Occupational Therapy           [x]  Speech Therapy     []  Hold Rehab until further notice     5. Medications:    [x]  MAR Reviewed     [x]  Continue Present Medications     6. DVT Prophylaxis:      []  Lovenox     [x]  Unfractionated Heparin     []  Coumadin     []  Xarelto     [x]  MARY Stockings     []  Sequential Compression Device     []  None     7.  Orders:   > Acute Ischemic Stroke (acute right basal ganglia region infarct) with residual left hemiparesis and mild cognitive-linguistic deficits   > Continue:    > Aspirin 325 mg PO once daily with breakfast    > Atorvastatin 40 mg PO q HS     > Dyslipidemia with low high density lipoprotein (HDL) cholesterol with hypertriglyceridemia   > Patient was started on Atorvastatin 40 mg PO q HS at [facility]    > Lipid profile (4/12/2017): , , HDL 29, LDL 55   > On 4/12/2017, added Lovaza 1 gram PO BID   > Continue:    > Atorvastatin 40 mg PO q HS     > Lovaza 1 gram PO BID    > Hypertensive heart disease without heart failure   > Upon admission to the ARU:    > Discontinued Hydrochlorothiazide 25 mg PO once daily    > Increased Amlodipine from 5 mg to 10 mg PO once daily (6AM)    > Increased Lisinopril from 20 mg to 40 mg PO once daily (9AM)    > Added Hydralazine 25 mg PO TID PRN for SBP greater than 150 mmHg or DBP greater than 100 mmHg   > On 4/12/2017, added Labetalol 100 mg PO q 12 hr (9AM, 9PM)   > On 4/14/2017, discontinued Hydralazine 25 mg PO TID PRN for SBP greater than 150 mmHg or DBP greater than 100 mmHg   > Continue:    > Amlodipine 10 mg PO once daily (6AM)    > Lisinopril 40 mg PO once daily (9AM)    > Labetalol 100 mg PO q 12 hr (9AM, 9PM)       > Type 2 diabetes mellitus, newly diagnosed, with chronic kidney disease stage G2/A1   > Continue:    > Metformin 500 mg PO BID with meals    > Glipizide 5 mg PO once daily before breakfast    > Humalog insulin sliding scale SC TID AC only         8. Discharge Planning:   > For discharge to home on Tuesday (4/18/2017)   > Outpatient physical therapy, occupational therapy and speech therapy   > F/U: 1. PCP (Dr. Niko Painetr)    2.  Neurology (Dr. Marianna Castaneda)    D/w patient  Signed:    Taz Bhagat MD      April 17, 2017

## 2017-04-17 NOTE — PROGRESS NOTES
[x] Psychology  [] Social Work [] Recreational Therapy    INTERVENTION  UNITS/TIME OF SERVICE   Assessment April 17, 2017   Supportive Counseling    Orientation    Discharge Planning    Resource Linkage              Progress/Current Status    Patient seen for Psychological Evaluation as requested on admission to ARU by Dr. Trinh Price. Patient is now expecting his discharge from ARU tomorrow. He expressed various concerns regarding his post stroke recovery, including the fact that this is his second occurrence; yet, he seems to partially blame himself for not taking better care of himself with health care management. He is fully aware of his need to be more proactive for same, going forward. Patient and his sister were educated about all aspects of stroke occurrence and recovery, including risk factors for recurrence. He otherwise denies any history of psychiatric services, but admits to some trepidation following second stroke.     Collin Howell, PHD 4/17/2017 3:30 PM

## 2017-04-17 NOTE — DIABETES MGMT
Diabetes Patient/Family Education Record    Factors That  May Influence Patients Ability  to Learn or  Comply With  Recommendations:    []   Language barrier    []   Cultural needs   []   Motivation    []   Cognitive limitation    []   Physical   [x]   Education    []   Physiological factors   []   Hearing/vision/speaking impairment   []   P[employer]al beliefs    []   Financial factors   []  Other:   []  No factors identified at this time. Person Instructed:   [x]   Patient   [x]   Family: supportive sister arrived and stated that she will assist her brother with home diabetes management. []  Other     Preference for Learning:   [x]   Verbal   [x]   Written   []  Demonstration     Level of Comprehension & Competence:    [x]  Good                                      [] Fair                                     []  Poor                             []  Needs Reinforcement   [x]  Teachback completed    Education Component:   [x]  Medication management, including how to administer insulin (if appropriate) and potential medication interactions: Patient stated that he's hoping to only go home on oral diabetes med, but completed insulin  Instruction: vial and pen. [x]  Nutritional management including the role of carbohydrate intake: Explained the importance of eating 3 meals daily, serving size/portion control of carbs (starches, fruits, dairy), and limit intake of concentrated sweets. [x]  Exercise: Encouraged patient to follow recommended exercise by PT for safety. He verbalized understanding. [x]  Signs, symptoms, and treatment of hyperglycemia and hypoglycemia   [x] Treatment of hyperglycemia and hypoglycemia   [x]  Importance of blood glucose monitoring and how to obtain a blood glucose meter: Patient stated that he received information that he is covered under [facility] and the plan require that he use OneTouch BG meter.   Patient stated that he already received physician prescription for OneTouch BG. [x]  Instruction on use of blood glucose meter   [x]  Discuss the importance of HbA1C monitoring and provide patient with results: 10.5% (04/12/2017) is equivalent to average blood glucose of 255 mg during the past 2-3 months. Patient verbalized understanding that the recommended A1C is 7% or better. []  Sick day guidelines   [x]  Proper use and disposal of lancets, needles, syringes or insulin pens (if appropriate)   [x]  Potential long-term complications (retinopathy, kidney disease, neuropathy, heart disease, stroke, vascular disease, foot care)   [x] Provide emergency contact number and contact number for more information    [x]  Goal:  Patient/family will demonstrate understanding of Diabetes Self Management Skills by: 04/24/2017  Plan for post-discharge education or self-management support:    [x] Outpatient class schedule provided: Patient and his sister were both receptive to attending classes.             [] Patient Declined    [] Scheduled for outpatient classes (date) _______         Claudia Pal RN

## 2017-04-17 NOTE — PROGRESS NOTES
Problem: Mobility Impaired (Adult and Pediatric)  Goal: *Acute Goals and Plan of Care (Insert Text)  Physical Therapy Goals  Initiated 2017 updated 17 for d/c 17  1. Patient will move from supine to sit and sit to supine , scoot up and down and roll side to side in bed with independence. (MET)  2. Patient will transfer from bed to chair and chair to bed with independence using the least restrictive device. (MET)  3. Patient will perform sit to stand with independence. (MET)  4. Patient will ambulate with independence for 250 feet with the least restrictive device. (MET)  5. Patient will ascend/descend 12 stairs with 1 handrail(s) with modified independence.  (S)  PHYSICAL THERAPY DISCHARGE SUMMARY  Patient Name:Juan Reyna    Precautions at discharge:  minimal fall risk     Pain at start of tx: no c/o   Pain at stop of tx: no c/o      Patient identified with name and : yes     Problem List:    Decreased strength B LE  [ ]     Decreased strength trunk/core  [ ]     Decreased AROM   [ ]     Decreased PROM  [ ]     Decreased balance sitting  [ ]     Decreased balance standing  [X]     Decreased endurance  [X]     Pain  [ ]        Functional Limitations:   Decreased independence with bed mobility  [ ]     Decreased independence with functional transfers  [ ]     Decreased independence with ambulation  [ ]     Decreased independence with stair negotiation  [X]                Outcome Measures: FIM and MMT                 MMT Initial Asssessment    Right Lower Extremity Left Lower Extremity   Hip Flexion 5 3+   Knee Extension 5 5   Knee Flexion 5 4+   Ankle Dorsiflexion 5 4-                  MMT Discharge Assessment    Right Lower Extremity Left Lower Extremity   Hip Flexion  5  4+   Knee Extension  5  4+   Knee Flexion  5  4+   Ankle Dorsiflexion  5  4+   0/5       No palpable muscle contraction  1/5       Palpable muscle contraction, no joint movement  2-/5      Less than full range of motion in gravity eliminated position  2/5       Able to complete full range of motion in gravity eliminated position  2+/5     Able to initiate movement against gravity  3-/5      More than half but not full range of motion against gravity  3/5       Able to complete full range of motion against gravity  3+/5     Completes full range of motion against gravity with minimal resistance  4-/5      Completes full range of motion against gravity with minimal-moderate resistance  4/5       Completes full range of motion against gravity with moderate resistance  4+/5     Completes full range of motion against gravity with moderate-maximum resistance  5/5       Completes full range of motion against gravity with maximum resistance                 AROM: within functional limits      FIM SCORES Initial Assessment Discharge Assessment   Bed/Chair/Wheelchair Transfers 5 7   Wheelchair Mobility 6 6   Walking [place] 5 6   Steps/Stairs 1 5   PRIMARY MODE OF LOCOMOTION: ambulation  Please see [facility] Interdisciplinary Eval: Coordination/Balance Section for details regarding FIM score description. BED/CHAIR/WHEELCHAIR TRANSFERS Initial Assessment Discharge Assessment   Rolling Right 5 (Supervision) 6 (Modified independent)   Rolling Left 5 (Supervision) 6 (Modified independent)   Supine to Sit 5 (Supervision) 6 (Modified independent)   Sit to Stand Supervision Completely independent   Sit to Supine 5 (Supervision) 6 (Modified independent)   Transfer Assist Score 5 7   Transfer Type Other Other   Comments Patient performs stand step transfer with SPV requiring initiall cues for safe hand placement though he demosntrates appropriate management of transfer ability otherwise. Pt performed sit to stand and txfrs Independently. Pt is Varsha with bed mobility.     Car Transfer Not tested Not tested   Car Type NA NA          WHEELCHAIR MOBILITY/MANAGEMENT Initial Assessment Discharge Assessment   Able to Propel 300 feet     Functional Level 6 6 Curbs/ramps assistance required 0 (Not tested)     Wheelchair set up assistance required 5 (Supervision/setup)     Wheelchair management Manages left brake, Manages right brake, Manages left footrest, Manages right footrest  w/c d/c'd on day of eval due to pt's high functional level          WALKING INDEPENDENCE Initial Assessment Discharge Assessment   Assistive device Walker, rolling  (no AD)   Ambulation assistance - level surface  5(S)  6(Varsha)    Distance 220 Feet (ft) (x 2 sets) 2000 Feet (ft)   Functional Level 5 6   Comments Patient ambulates 220 ft with SPV requirning PT presence for safety. 6(Varsha) for household distances   Ambulation assistance - unlevel surface     5(S) for uneven outdoor ambulation          STEPS/STAIRS Initial Assessment Discharge Assessment   Steps/Stairs ambulated 3 (R HR non-reciprocal pattern SPV) 24 (in stairwell)   Rail Use Right  Right    Functional Level 1 5   Comments 3 steps w/ R HR; non-reciprocal pattern SPV level  Pt required S for safety   Curbs/Ramps    Varsha      Intervention 4/17/17: Weekly assessment performed for d/c requiring patient to demonstrate all aspects of functional mobility at highest level of function. See above for current status. Pt participated in RT group to play corn hole to challenge dynamic standing balance, and pt performed in tandem stance to increase challenge. Pt performed multiple dynamic balance and BLE strengthening activities to improve balance and strength in LLE. Pt demo'd significant improvement in tandem gait on straight line with significant decreased LOB. PHYSICAL THERAPY PLAN OF CARE     LTGs: Pt met all goals except for Varsha on stairs, pt requires dist S/S for safety. Pt would benefit from outpatient physical therapy in order to improve high level balance and functional challenges in order to return to work.  Interventions may include range of motion (AROM, PROM B LE/trunk), motor function (B LE/trunk strengthening/coordination), activity tolerance (vitals, oxygen saturation levels), bed mobility training, balance activities, gait training (progressive ambulation program), and functional transfer training. HEP handout:  Issued by PT     Pt to be discharged 4/18/17 with HEP provided by PTA. Therapy Recommendations upon discharge:   outpatient PT  Equipment needs at discharge:     none needed     Please see IRC; Interdisciplinary Eval, Care Plan, and Patient Education for further information regarding physical therapy discharge summary and plan of care.       Lorenza Llamas PTA  4/17/2017

## 2017-04-17 NOTE — ROUTINE PROCESS
[facility]    Bedside and Verbal shift change report given to  Jordan Hernandez RN  (oncoming nurse) by Ramirez Gottlieb RN   (offgoing nurse). Report included the following information SBAR, Kardex, MAR and Recent Results.     Situation:   Code Status: Full Code   Reason for Admission: CVA  Hospital Day: 6   Problem List:   Hospital Problems  Date Reviewed: 4/16/2017          Codes Class Noted POA    Vitamin D insufficiency (Chronic) ICD-10-CM: E55.9  ICD-9-CM: 268.9  4/12/2017 Yes    Overview Signed 4/12/2017 10:03 AM by Claribel Marina MD     Vitamin D 25-Hydroxy (4/12/2017) = 22.6             Chronic kidney disease (CKD) stage G2/A1, mildly decreased glomerular filtration rate (GFR) between 60-89 mL/min/1.73 square meter and albuminuria creatinine ratio less than 30 mg/g (Chronic) ICD-10-CM: N18.2  ICD-9-CM: 585.2  4/12/2017 Yes        Hypertensive heart disease without heart failure (Chronic) ICD-10-CM: I11.9  ICD-9-CM: 402.90  Unknown Yes        * (Principal)Acute ischemic stroke ([facility] ICD-10-CM: I63.9  ICD-9-CM: 434.91  4/6/2017 Yes    Overview Addendum 4/12/2017  5:37 PM by Claribel Marina MD     Acute Ischemic Stroke (acute right basal ganglia region infarct) with residual left hemiparesis and mild cognitive-linguistic deficits             Impaired mobility and ADLs ICD-10-CM: Z74.09  ICD-9-CM: 799.89  4/6/2017 Yes        Hemiparesis affecting left side as late effect of cerebrovascular accident (CVA) ([facility] ICD-10-CM: B90.372  ICD-9-CM: 438.20  4/6/2017 Yes        Dyslipidemia with low high density lipoprotein (HDL) cholesterol with hypertriglyceridemia due to type 2 diabetes mellitus (HCC) (Chronic) ICD-10-CM: E11.69, E78.6, E78.1  ICD-9-CM: 250.80, 272.4  4/6/2017 Yes    Overview Signed 4/12/2017 10:10 AM by Claribel Marina MD     Lipid profile (4/12/2017): , , HDL 29, LDL 55             Type 2 diabetes mellitus with stage 2 chronic [facility] ICD-10-CM: E11.22, N18.2  ICD-9-CM: 250.40, 585.2  4/6/2017 Yes    Overview Signed 4/13/2017  1:51 PM by Ashley Alvarado MD     HbA1c (4/12/2017) = 10.5                   Background:   Past Medical History:   Past Medical History:   Diagnosis Date    Acute ischemic stroke ([facility] 04/06/2017    Acute Ischemic Stroke (acute right basal ganglia region infarct) with residual left hemiparesis and mild cognitive-linguistic deficits    Chronic kidney disease (CKD) stage G2/A1, mildly decreased glomerular filtration rate (GFR) between 60-89 mL/min/1.73 square meter and albuminuria creatinine ratio less than 30 mg/g 1/97/5281    Diastolic dysfunction without heart failure     Dyslipidemia with low high density lipoprotein (HDL) cholesterol with hypertriglyceridemia due to type 2 diabetes mellitus ([facility] 4/6/2017    Lipid profile (4/12/2017): , , HDL 29, LDL 55    Hemiparesis affecting left side as late effect of cerebrovascular accident (CVA) ([facility] 4/6/2017    Obesity, Class I, BMI 30-34.9     Type 2 diabetes mellitus with stage 2 chronic kidney disease ([facility] 04/06/2017    HbA1c (4/12/2017) = 10.5    Vitamin D insufficiency 4/12/2017    Vitamin D 25-Hydroxy (4/12/2017) = 22.6      Patient taking anticoagulants yes    Patient has a defibrillator: no     Assessment:   Changes in Assessment throughout shift: no     Patient has central line: no Reasons if yes: Insertion date: Last dressing date:   Patient has Pearce Cath: no Reasons if yes:     Insertion date:     Last Vitals:     Vitals:    04/16/17 1600 04/16/17 1930 04/17/17 0628 04/17/17 0943   BP: 135/88 (!) 154/98 120/78 (!) 132/91   Pulse: 62 80 63 74   Resp: 16 16 22   Temp: 97 °F (36.1 °C) 99.1 °F (37.3 °C)  97.2 °F (36.2 °C)   SpO2: 99% 98%  100%   Weight:       Height:            PAIN    Pain Assessment    Pain Intensity 1: 0 (04/17/17 1200) Pain Intensity 1: 2 (12/29/14 1105)      Pain Location 1: Abdomen      Pain Intervention(s) 1: Medication (see MAR)  Patient Stated Pain Goal: 0 Patient Stated Pain Goal: 0  o Intervention effective: yes    o Other actions taken for pain: no     Skin Assessment  Skin color    Condition/Temperature    Integrity    Turgor    Weekly Pressure Ulcer Documentation Weekly Pressure Ulcer Documentation: No Pressure Ulcer Noted-Pressure Ulcer Prevention Initiated  Wound Prevention & Protection Methods  Orientation of wound Orientation of Wound Prevention: Posterior  Location of Prevention Location of Wound Prevention: Sacrum/Coccyx  Dressing Present Dressing Present : No  Dressing Status    Wound Offloading Wound Offloading (Prevention Methods): Bed, pressure redistribution/air, Chair cushion, Wheelchair     INTAKE/OUPUT    Date 04/16/17 0700 - 04/17/17 0659 04/17/17 0700 - 04/18/17 0659   Shift 2237-9122 0768-5552 24 Hour Total 0494-5657 5698-8701 24 Hour Total   I  N  T  A  K  E   P. O. 720  720 300  300      P. O. 720  720 300  300    Shift Total  (mL/kg) 720  (7.9)  720  (7.9) 300  (3.3)  300  (3.3)   O  U  T  P  U  T   Urine  (mL/kg/hr)            Urine Occurrence(s) 6 x 0 x 6 x 1 x  1 x    Stool            Stool Occurrence(s) 0 x 0 x 0 x 0 x  0 x    Shift Total  (mL/kg)           720 300  300   Weight (kg) 91.1 91.1 91.1 91.1 91.1 91.1       Recommendations:  1. Patient needs and requests: education    2. Diet: cardiac    3. Pending tests/procedures: No     4. Functional Level/Equipment: independently    5. Estimated Discharge Date: 4/18/17 Posted on Whiteboard in Patients Room: yes     HEALS Safety Check    A safety check occurred in the patient's room between off going nurse and oncoming nurse listed above.     The safety check included the below items  Area Items   H  High Alert Medications - Verify all high alert medication drips (heparin, PCA, etc.)   E  Equipment - Suction is set up for ALL patients (with yanker)  - Red plugs utilized for all equipment (IV pumps, etc.)  - WOWs wiped down at end of shift.  - Room stocked with oxygen, suction, and other unit-specific supplies   A  Alarms - Bed alarm is set for fall risk patients  - Ensure chair alarm is in place and activated if patient is up in a chair   L  Lines - Check IV for any infiltration  - Pearce bag is empty if patient has a Pearce   - Tubing and IV bags are labeled   S  Safety   - Room is clean, patient is clean, and equipment is clean. - Hallways are clear from equipment besides carts. - Fall bracelet on for fall risk patients  - Ensure room is clear and free of clutter  - Suction is set up for ALL patients (with gonzalez)  - Hallways are clear from equipment besides carts.    - Isolation precautions followed, supplies available outside room, sign posted

## 2017-04-18 VITALS
RESPIRATION RATE: 19 BRPM | OXYGEN SATURATION: 99 % | SYSTOLIC BLOOD PRESSURE: 137 MMHG | BODY MASS INDEX: 32.28 KG/M2 | HEART RATE: 83 BPM | WEIGHT: 200.84 LBS | DIASTOLIC BLOOD PRESSURE: 92 MMHG | HEIGHT: 66 IN | TEMPERATURE: 97.7 F

## 2017-04-18 LAB — GLUCOSE BLD STRIP.AUTO-MCNC: 142 MG/DL (ref 70–110)

## 2017-04-18 PROCEDURE — 74011250637 HC RX REV CODE- 250/637: Performed by: INTERNAL MEDICINE

## 2017-04-18 PROCEDURE — 82962 GLUCOSE BLOOD TEST: CPT

## 2017-04-18 PROCEDURE — 74011250636 HC RX REV CODE- 250/636: Performed by: INTERNAL MEDICINE

## 2017-04-18 RX ADMIN — LISINOPRIL 40 MG: 20 TABLET ORAL at 09:17

## 2017-04-18 RX ADMIN — HEPARIN SODIUM 5000 UNITS: 5000 INJECTION, SOLUTION INTRAVENOUS; SUBCUTANEOUS at 06:03

## 2017-04-18 RX ADMIN — OMEGA-3-ACID ETHYL ESTERS 1000 MG: 900 CAPSULE, LIQUID FILLED ORAL at 09:17

## 2017-04-18 RX ADMIN — GLIPIZIDE 5 MG: 5 TABLET ORAL at 09:17

## 2017-04-18 RX ADMIN — VITAMIN D, TAB 1000IU (100/BT) 2000 UNITS: 25 TAB at 09:17

## 2017-04-18 RX ADMIN — METFORMIN HYDROCHLORIDE 500 MG: 500 TABLET, FILM COATED ORAL at 09:17

## 2017-04-18 RX ADMIN — LABETALOL HYDROCHLORIDE 100 MG: 100 TABLET, FILM COATED ORAL at 09:17

## 2017-04-18 RX ADMIN — AMLODIPINE BESYLATE 10 MG: 5 TABLET ORAL at 06:04

## 2017-04-18 RX ADMIN — ASPIRIN 325 MG ORAL TABLET 325 MG: 325 PILL ORAL at 09:17

## 2017-04-18 NOTE — DISCHARGE INSTRUCTIONS
DISCHARGE SUMMARY from Nurse    The following personal items are in your possession at time of discharge:    Dental Appliances: None  Visual Aid: At bedside, Glasses     Home Medications: None  Jewelry: None  Clothing: None  Other Valuables: Cell Phone  Personal Items Sent to Safe: none          PATIENT INSTRUCTIONS:    After general anesthesia or intravenous sedation, for 24 hours or while taking prescription Narcotics:  · Limit your activities  · Do not drive and operate hazardous machinery  · Do not make important personal or business decisions  · Do  not drink alcoholic beverages  · If you have not urinated within 8 hours after discharge, please contact your surgeon on call. Report the following to your surgeon:  · Excessive pain, swelling, redness or odor of or around the surgical area  · Temperature over 100.5  · Nausea and vomiting lasting longer than 4 hours or if unable to take medications  · Any signs of decreased circulation or nerve impairment to extremity: change in color, persistent  numbness, tingling, coldness or increase pain  · Any questions        What to do at Home:  Recommended activity: Activity as tolerated, or as directed by your physician. If you experience any of the following symptoms chest pain or shortness of breath , please follow up with the emergency dept. *  Please give a list of your current medications to your Primary Care Provider. *  Please update this list whenever your medications are discontinued, doses are      changed, or new medications (including over-the-counter products) are added. *  Please carry medication information at all times in case of emergency situations. These are general instructions for a healthy lifestyle:    No smoking/ No tobacco products/ Avoid exposure to second hand smoke    Surgeon General's Warning:  Quitting smoking now greatly reduces serious risk to your health.     Obesity, smoking, and sedentary lifestyle greatly increases your risk for illness    A healthy diet, regular physical exercise & weight monitoring are important for maintaining a healthy lifestyle    You may be retaining fluid if you have a history of heart failure or if you experience any of the following symptoms:  Weight gain of 3 pounds or more overnight or 5 pounds in a week, increased swelling in our hands or feet or shortness of breath while lying flat in bed. Please call your doctor as soon as you notice any of these symptoms; do not wait until your next office visit. Recognize signs and symptoms of STROKE:    F-face looks uneven    A-arms unable to move or move unevenly    S-speech slurred or non-existent    T-time-call 911 as soon as signs and symptoms begin-DO NOT go       Back to bed or wait to see if you get better-TIME IS BRAIN. Warning Signs of HEART ATTACK     Call 911 if you have these symptoms:   Chest discomfort. Most heart attacks involve discomfort in the center of the chest that lasts more than a few minutes, or that goes away and comes back. It can feel like uncomfortable pressure, squeezing, fullness, or pain.  Discomfort in other areas of the upper body. Symptoms can include pain or discomfort in one or both arms, the back, neck, jaw, or stomach.  Shortness of breath with or without chest discomfort.  Other signs may include breaking out in a cold sweat, nausea, or lightheadedness. Don't wait more than five minutes to call 911 - MINUTES MATTER! Fast action can save your life. Calling 911 is almost always the fastest way to get lifesaving treatment. Emergency Medical Services staff can begin treatment when they arrive -- up to an hour sooner than if someone gets to the hospital by car. The discharge information has been reviewed with the patient. The patient verbalized understanding.     Discharge medications reviewed with the patient and appropriate educational materials and side effects teaching were provided. Patient armband removed and shredded      MyChart Activation    Thank you for requesting access to [email or web address]. Please follow the instructions below to securely access and download your online medical record. [email or web address] allows you to send messages to your doctor, view your test results, renew your prescriptions, schedule appointments, and more. How Do I Sign Up? 1. In your internet browser, go to [email or web address]  2. Click on the First Time User? Click Here link in the Sign In box. You will be redirect to the New Member Sign Up page. 3. Enter your [email or web address] Access Code exactly as it appears below. You will not need to use this code after youve completed the sign-up process. If you do not sign up before the expiration date, you must request a new code. [email or web address] Access Code: [email or web address]  Expires: 7/10/2017  8:26 PM (This is the date your [email or web address] access code will )    4. Enter the last four digits of your Social Security Number (xxxx) and Date of Birth (mm/dd/yyyy) as indicated and click Submit. You will be taken to the next sign-up page. 5. Create a [email or web address] ID. This will be your [email or web address] login ID and cannot be changed, so think of one that is secure and easy to remember. 6. Create a [email or web address] password. You can change your password at any time. 7. Enter your Password Reset Question and Answer. This can be used at a later time if you forget your password. 8. Enter your e-mail address. You will receive e-mail notification when new information is available in [address]. 9. Click Sign Up. You can now view and download portions of your medical record. 10. Click the Download Summary menu link to download a portable copy of your medical information. Additional Information    If you have questions, please visit the Frequently Asked Questions section of the [email or web address] website at [email or web address] Remember, [email or web address] is NOT to be used for urgent needs.  For medical emergencies, dial 911.                  ------------------------------------------------------------------------------------------------------------    DISCHARGE INSTRUCTIONS    1. Make sure that when you request refills at the pharmacy that the refill requests are sent to your PCP (NOT to the prescriber at the [facility]) to avoid any delays in getting your medication refills. The physician at the [facility] will not able to order medications or refills after discharge -- Please understand that though we would like to help, it is simply not safe for our physician to order you a medication that we cannot monitor. 2. If any of the prescribed medications require a prior authorization, contact your Primary Care Physician or specialist to EITHER complete prior authorizations and paperwork on your behalf OR prescribe an alternative medication. -- Please understand that though we would like to help, it is simply not safe for our physician to order you a medication that we cannot monitor. 3. Over-the-counter (OTC) medications will NOT be prescribed. You need to buy these medications over-the-counter.     4. If you will be having outpatient therapy, make sure you have the paper prescription for your outpatient therapy when you leave the hospital. Also make sure that you bring this paper prescription to the outpatient gym you are going to get your outpatient therapy.    -------------------------------------------------------------------------------------------------------------------

## 2017-04-18 NOTE — PROGRESS NOTES
Problem: Neurolinguistics Impaired (Adult)  Goal: *Speech Goal: (INSERT TEXT)  Long term goals:  1. Patient will recall short lists of up to 5 related items with 90% accuracy. 2. Patient will perform varied functional problem solving tasks (higher level) with 90% accuracy. 3. Patient will read paragraphs and respond to content questions with % accuracy. 4. Patient will discuss commonly heard figurative language (idioms) with 90% accuracy. Short term goals (by 4/19/17):  1. Patient will recall short lists of up to 5 related items with 75-85%% accuracy. 2. Patient will perform varied functional problem solving tasks (higher level) with 75-85% accuracy. 3. Patient will read paragraphs and respond to content questions with 80-90% accuracy. 4. Patient will discuss commonly heard figurative language (idioms) with 75-85% accuracy. Outcome: Resolved/Met Date Met:  04/18/17     Mr. Bree Carreon was not see for therapy prior to his discharge to home. Patient has a follow up appointment with outpatient Speech Therapy.      Federico Vela, OFELIA-SLP

## 2017-04-18 NOTE — ROUTINE PROCESS
SHIFT CHANGE NOTE FOR [facility]    Bedside and Verbal shift change report given to  Arvind Zaldivar RN  (oncoming nurse) by Deanna Hartman RN   (offgoing nurse). Report included the following information SBAR, Kardex, MAR and Recent Results.     Situation:   Code Status: Full Code   Reason for Admission: CVA  Hospital Day: 7   Problem List:   Hospital Problems  Date Reviewed: 4/16/2017          Codes Class Noted POA    Vitamin D insufficiency (Chronic) ICD-10-CM: E55.9  ICD-9-CM: 268.9  4/12/2017 Yes    Overview Signed 4/12/2017 10:03 AM by Jayme Triana MD     Vitamin D 25-Hydroxy (4/12/2017) = 22.6             Chronic kidney disease (CKD) stage G2/A1, mildly decreased glomerular filtration rate (GFR) between 60-89 mL/min/1.73 square meter and albuminuria creatinine ratio less than 30 mg/g (Chronic) ICD-10-CM: N18.2  ICD-9-CM: 585.2  4/12/2017 Yes        Hypertensive heart disease without heart failure (Chronic) ICD-10-CM: I11.9  ICD-9-CM: 402.90  Unknown Yes        * (Principal)Acute ischemic stroke ([facility] ICD-10-CM: I63.9  ICD-9-CM: 434.91  4/6/2017 Yes    Overview Addendum 4/12/2017  5:37 PM by Jayme Triana MD     Acute Ischemic Stroke (acute right basal ganglia region infarct) with residual left hemiparesis and mild cognitive-linguistic deficits             Impaired mobility and ADLs ICD-10-CM: Z74.09  ICD-9-CM: 799.89  4/6/2017 Yes        Hemiparesis affecting left side as late effect of cerebrovascular accident (CVA) ([facility] ICD-10-CM: X98.862  ICD-9-CM: 438.20  4/6/2017 Yes        Dyslipidemia with low high density lipoprotein (HDL) cholesterol with hypertriglyceridemia due to type 2 diabetes mellitus (HCC) (Chronic) ICD-10-CM: E11.69, E78.6, E78.1  ICD-9-CM: 250.80, 272.4  4/6/2017 Yes    Overview Signed 4/12/2017 10:10 AM by Jayme Triana MD     Lipid profile (4/12/2017): , , HDL 29, LDL 55             Type 2 diabetes mellitus with stage 2 chronic kidney disease [facility] ICD-10-CM: E11.22, N18.2  ICD-9-CM: 250.40, 585.2  4/6/2017 Yes    Overview Signed 4/13/2017  1:51 PM by Junaid Keller MD     HbA1c (4/12/2017) = 10.5                   Background:   Past Medical History:   Past Medical History:   Diagnosis Date    [facility]/2017    Acute Ischemic Stroke (acute right basal ganglia region infarct) with residual left hemiparesis and mild cognitive-linguistic deficits    Chronic kidney disease (CKD) stage G2/A1, mildly decreased glomerular filtration rate (GFR) between 60-89 mL/min/1.73 square meter and albuminuria creatinine ratio less than 30 mg/g 0/35/2624    Diastolic dysfunction without heart failure     Dyslipidemia with low high density lipoprotein (HDL) cholesterol with hypertriglyceridemia due to type 2 diabetes mellitus ([facility] 4/6/2017    Lipid profile (4/12/2017): , , HDL 29, LDL 55    Hemiparesis affecting left side as late effect of cerebrovascular accident (CVA) ([facility] 4/6/2017    Obesity, Class I, BMI 30-34.9     Type 2 diabetes mellitus with stage 2 chronic kidney disease ([facility] 04/06/2017    HbA1c (4/12/2017) = 10.5    Vitamin D insufficiency 4/12/2017    Vitamin D 25-Hydroxy (4/12/2017) = 22.6      Patient taking anticoagulants yes    Patient has a defibrillator: no     Assessment:   Changes in Assessment throughout shift: no     Patient has central line: no Reasons if yes: Insertion date: Last dressing date:   Patient has Pearce Cath: no Reasons if yes:     Insertion date:     Last Vitals:     Vitals:    04/17/17 0943 04/17/17 1639 04/17/17 2109 04/17/17 2121   BP: (!) 132/91 133/82 138/90 138/90   Pulse: 74 79 79 79   Resp: 22 18 19    Temp: 97.2 °F (36.2 °C) 98 °F (36.7 °C) 97.6 °F (36.4 °C)    SpO2: 100% 97% 96%    Weight:       Height:            PAIN    Pain Assessment    Pain Intensity 1: 0 (04/18/17 0000) Pain Intensity 1: 2 (12/29/14 1105)      Pain Location 1: Abdomen      Pain Intervention(s) 1: Medication (see MAR)  Patient Stated Pain Goal: 0 Patient Stated Pain Goal: 0  o Intervention effective: yes    o Other actions taken for pain: no     Skin Assessment  Skin color    Condition/Temperature    Integrity    Turgor    Weekly Pressure Ulcer Documentation Weekly Pressure Ulcer Documentation: No Pressure Ulcer Noted-Pressure Ulcer Prevention Initiated  Wound Prevention & Protection Methods  Orientation of wound Orientation of Wound Prevention: Posterior  Location of Prevention Location of Wound Prevention: Sacrum/Coccyx  Dressing Present Dressing Present : No  Dressing Status    Wound Offloading Wound Offloading (Prevention Methods): Bed, pressure redistribution/air     INTAKE/OUPUT    Date 04/17/17 0700 - 04/18/17 0659 04/18/17 0700 - 04/19/17 0659   Shift 0700-1859 6238-8068 24 Hour Total 0700-1859 1900-0659 24 Hour Total   I  N  T  A  K  E   P.O. 300  300         P. O. 300  300       Shift Total  (mL/kg) 300  (3.3)  300  (3.3)      O  U  T  P  U  T   Urine  (mL/kg/hr) 0  (0)  0         Urine Voided 0  0         Urine Occurrence(s) 2 x 1 x 3 x       Emesis/NG output 0  0         Emesis 0  0       Stool            Stool Occurrence(s) 0 x 0 x 0 x       Shift Total  (mL/kg) 0  (0)  0  (0)        300      Weight (kg) 91.1 91.1 91.1 91.1 91.1 91.1       Recommendations:  1. Patient needs and requests: education    2. Diet: cardiac    3. Pending tests/procedures: No     4. Functional Level/Equipment: independently    5. Estimated Discharge Date: 4/18/17 Posted on Whiteboard in Patients Room: yes     HEALS Safety Check    A safety check occurred in the patient's room between off going nurse and oncoming nurse listed above.     The safety check included the below items  Area Items   H  High Alert Medications - Verify all high alert medication drips (heparin, PCA, etc.)   E  Equipment - Suction is set up for ALL patients (with yanker)  - Red plugs utilized for all equipment (IV pumps, etc.)  - WOWs wiped down at end of shift.  - Room stocked with oxygen, suction, and other unit-specific supplies   A  Alarms - Bed alarm is set for fall risk patients  - Ensure chair alarm is in place and activated if patient is up in a chair   L  Lines - Check IV for any infiltration  - Pearce bag is empty if patient has a Pearce   - Tubing and IV bags are labeled   S  Safety   - Room is clean, patient is clean, and equipment is clean. - Hallways are clear from equipment besides carts. - Fall bracelet on for fall risk patients  - Ensure room is clear and free of clutter  - Suction is set up for ALL patients (with zainaker)  - Hallways are clear from equipment besides carts.    - Isolation precautions followed, supplies available outside room, sign posted

## 2017-04-18 NOTE — ROUTINE PROCESS
0800  PT. Awake sitting up in chair no change in assessment pt. Reported to be feeling fine. Pt. Reported to be ready for discharge to home. 0930 PT. Still in chair eating breakfast no complaints.

## 2017-04-18 NOTE — DISCHARGE SUMMARY
[address]  [address] [place] [address], [address]     INPATIENT REHABILITATION  DISCHARGE SUMMARY    Name: Philip Garza MRN: [id number]   Age / Sex: 36 y.o. / male CSN: [id number]   YOB: 1975 Length of Stay: 7 days   Admit Date: 4/11/2017 Discharge Date: 4/18/2017       PRIMARY CARE PHYSICIAN: Leanne Kim MD      DISCHARGE DIAGNOSES:    Primary Rehabilitation Diagnosis  1. Impaired Mobility and ADLs  2. Acute Ischemic Stroke (acute right basal ganglia region infarct) with residual left hemiparesis and mild cognitive-linguistic deficits      Comorbidities   Hypertensive heart disease without heart failure    Diastolic dysfunction without heart failure    Dyslipidemia with low high density lipoprotein (HDL) cholesterol with hypertriglyceridemia due to type 2 diabetes mellitus    Chronic kidney disease (CKD) stage G2/A1, mildly decreased glomerular filtration rate (GFR) between 60-89 mL/min/1.73 square meter and albuminuria creatinine ratio less than 30 mg/g    Type 2 diabetes mellitus with stage 2 chronic kidney disease     Obesity, Class I, BMI 30-34.9    Vitamin D insufficiency       CONSULTS CALLED: None      PROCEDURES DONE: None      BRIEF HISTORY: The patient is a 43-year-old, obese, White male with hypertension who was admitted to [facility] on 4/6/2017 due to left arm numbness and left facial droop. The patient was apparently well until the day of admission, while at work, the patient experienced left arm heaviness and left facial droop. He called his girlfriend who brought him to an urgent care center. They were told to go to the emergency room. The patient was brought to the [facility] Emergency Department for further evaluation. Speech was clear and thought process was clear.  CT scan of the head showed no acute infarct, intracranial hemorrhage or mass effect; 9 mm left paramedian gulshan low-density, likely a chronic infarct; bilateral maxillary sinus disease. Patient was not deemed appropriate for intravenous tPA. The patient was admitted under the service of the [facility] (Dr. Marya Waller). Unfractionated heparin was given for DVT prophylaxis. Neurology consult (Dr. Ori Enriquez) was called for evaluation and comanagement. Patient was started on Aspirin. Patient was diagnosed with Type 2 diabetes mellitus and he was started on Levemir insulin. Lipid profile showed elevated triglycerides and LDL. The patient was started on Atorvastatin. MRI of the brain showed an acute/subacute right basal ganglia region infarct, 15 mm in diameter, involves right caudate body/tail, no hemorrhage or mass effect; mild to moderate burden of chronic lacunar disease in the brain stem and basal ganglia regions; bilateral maxillary sinus outflow obstruction. 2D echocardiogram showed LVEF 60%; mild left ventricular hypertrophy present; mild diastolic dysfunction; no masses, shunts or thrombi seen; negative bubble study x 2. Cardiology consult (Dr. Álvaro Conde) was called for evaluation and comanagement. CT angiogram of the head and neck showed no hemodynamically significant cervical vascular stenosis, suboptimal evaluation origin left vertebral artery and right common carotid artery due to artifact; moderate stenosis distal left intradural vertebral artery, mild stenosis proximal right intradural vertebral artery, mild to moderate stenosis distal left M1 and proximal M2 branches, mild stenosis bilateral posterior cerebral arteries. The patient had remained hemodynamically stable but due to the abovementioned neurologic deficit, the patient was noted to have impaired mobility and ADLs. Patient was felt to be a good candidate for acute inpatient rehabilitation. Upon evaluation by Physical Therapy and Occupational Therapy, the patient was recommended for acute inpatient rehabilitation.  The patient was discharged and was subsequently admitted to the [facility] for intensive rehabilitation to help recover strength, function and mobility. COURSE IN THE HOSPITAL: Upon admission to the [facility], the patient underwent physical therapy, occupational therapy and speech therapy. The patient was able to actively participate in the rehabilitation activities and progressed well. On discharge, the patient was able to perform the following activities:    1. Occupational Therapy    ON ADMISSION ON DISCHARGE   Eating  Functional Level: 6   Eating  Functional Level: 6     Grooming  Functional Level: 6   Grooming  Functional Level: 6     Bathing  Functional Level: 5   Bathing  Functional Level: 5     Upper Body Dressing  Functional Level: 7   Upper Body Dressing  Functional Level: 7     Lower Body Dressing  Functional Level: 5   Lower Body Dressing  Functional Level: 5     Toileting  Functional Level: 5   Toileting  Functional Level: 6     Toilet Transfers  Toilet Transfer Score: 5   Toilet Transfers  Toilet Transfer Score: 5     Tub /Shower Transfers  Tub/Shower Transfer Score: 4   Tub/Shower Transfers  Tub/Shower Transfer Score: 4       2.  Physical Therapy    ON ADMISSION ON DISCHARGE   Wheelchair Mobility/Management  Able to Propel (ft): 300 feet  Functional Level: 6  Curbs/Ramps Assist Required (FIM Score): 0 (Not tested)  Wheelchair Setup Assist Required : 5 (Supervision/setup)  Wheelchair Management: Manages left brake, Manages right brake, Manages left footrest, Manages right footrest Wheelchair Mobility/Management  Able to Propel (ft): 300 feet  Functional Level: 6  Curbs/Ramps Assist Required (FIM Score): 0 (Not tested)  Wheelchair Setup Assist Required : 5 (Supervision/setup)  Wheelchair Management: Manages left brake, Manages right brake, Manages left footrest, Manages right footrest     Gait  Amount of Assistance: 5 (Supervision/setup)  Distance (ft): 220 Feet (ft) (x 2 sets)  Assistive Device: Walker, rolling Gait  Amount of Assistance: 5 (Supervision/setup)  Distance (ft): 2000 Feet (ft)  Assistive Device:  (no AD)     Balance-Sitting/Standing  Sitting - Static: Normal   Sitting - Dynamic: Good (unsupported)  Standing - Static: Good  Standing - Dynamic : Impaired  Right Leg Stance-Unsupported : 10 Seconds  Left Leg Stance-Unsupported: 12 Seconds  Romberg: Fail Balance-Sitting/Standing  Sitting - Static: Good (unsupported)  Sitting - Dynamic: Good (unsupported)  Standing - Static: Constant support  Standing - Dynamic : Intact  Right Leg Stance-Unsupported : 10 Seconds  Left Leg Stance-Unsupported: 12 Seconds  Romberg: Fail     Bed/Mat Mobility  Rolling Right : 5 (Supervision)  Rolling Left : 5 (Supervision)  Supine to Sit : 5 (Supervision)  Sit to Supine : 5 (Supervision) Bed/Mat Mobility  Rolling Right : 6 (Modified independent)  Rolling Left : 6 (Modified independent)  Supine to Sit : 6 (Modified independent)  Sit to Supine : 6 (Modified independent)     Transfers  Transfer Type: Other  Other: Patient performs stand step transfer with SPV requiring initiall cues for safe hand placement though he demosntrates appropriate management of transfer ability otherwise. Transfer Assistance : 5 (Supervision/setup)  Sit to Stand Assistance: Supervision  Car Transfers: Not tested  Car Type: NA Transfers  Transfer Type: Other  Other: stand step without AD  Transfer Assistance : 7 (Independent)  Sit to Stand Assistance: Completely independent  Car Transfers: Not tested  Car Type: NA     Steps or Stairs  Steps/Stairs Ambulated (#): 3 (R HR non-reciprocal pattern SPV)  Level of Assist : 5 (Supervision/setup)  Rail Use: Right  Steps or Stairs  Steps/Stairs Ambulated (#): 24 (in stairwell)  Level of Assist : 5 (Supervision/setup)  Rail Use: Right        3.  Speech and Language Pathology    ON ADMISSION ON DISCHARGE   Comprehension (Native Language)  Primary Mode of Comprehension: Auditory  Score: 6 Comprehension (Native Language)  Primary Mode of Comprehension: Auditory  Score: 6     Expression (Native Language)  Primary Mode of Expression: Verbal  Score: 6   Expression (Native Language)  Primary Mode of Expression: Verbal  Score: 6     Social Interaction/Pragmatics  Score: 6 Social Interaction/Pragmatics  Score: 6     Problem Solving  Score: 6   Problem Solving  Score: 6     Memory  Score: 6 Memory  Score: 6       Legend:   7 - Independent   6 - Modified Independent   5 - Standby Assistance / Supervision / Set-up   4 - Minimum Assistance / Contact Guard Assistance   3 - Moderate Assistance   2 - Maximum Assistance   1 - Total Assistance / Dependent       ACUTE MEDICAL ISSUES ADDRESSED IN INPATIENT REHABILITATION FACILITY:     > Acute Ischemic Stroke (acute right basal ganglia region infarct) with residual left hemiparesis and mild cognitive-linguistic deficits   > Continue:    > Aspirin 325 mg PO once daily with breakfast    > Atorvastatin 40 mg PO q HS      > Dyslipidemia with low high density lipoprotein (HDL) cholesterol with hypertriglyceridemia   > Patient was started on Atorvastatin 40 mg PO q HS at [facility]    > Lipid profile (4/12/2017): , , HDL 29, LDL 55   > On 4/12/2017, added Lovaza 1 gram PO BID   > Continue:    > Atorvastatin 40 mg PO q HS       > Lovaza 1 gram PO BID     > Hypertensive heart disease without heart failure   > Upon admission to the ARU:    > Discontinued Hydrochlorothiazide 25 mg PO once daily    > Increased Amlodipine from 5 mg to 10 mg PO once daily (6AM)    > Increased Lisinopril from 20 mg to 40 mg PO once daily (9AM)    > Added Hydralazine 25 mg PO TID PRN for SBP greater than 150 mmHg or DBP greater than 100 mmHg   > On 4/12/2017, added Labetalol 100 mg PO q 12 hr (9AM, 9PM)   > On 4/14/2017, discontinued Hydralazine 25 mg PO TID PRN for SBP greater than 150 mmHg or DBP greater than 100 mmHg   > Continue:    > Amlodipine 10 mg PO once daily (6AM)    > Lisinopril 40 mg PO once daily (9AM)    > Labetalol 100 mg PO q 12 hr (9AM, 9PM)        > Type 2 diabetes mellitus, newly diagnosed, with chronic kidney disease stage G2/A1   > HbA1c (4/12/2017) = 10.5   > Upon admission to the ARU:    > Added Metformin 500 mg PO BID with meals    > Decreased Levemir from 18 units to 15 units SC q HS   > Microalbumin/Creatinine ratio(4/12/2017) = 10 mg/g   > On 4/13/2017, added Glipizide 5 mg PO once daily before breakfast   > On 4/14/2017, decreased Levemir from 15 units to 10 units SC q HS   > On 4/15/2017, discontinued Levemir 10 units SC q HS   > Continue:    > Metformin 500 mg PO BID with meals    > Glipizide 5 mg PO once daily before breakfast    > Humalog insulin sliding scale SC TID AC only      > Vitamin D Insufficiency   > Vitamin D 25-Hydroxy (4/12/2017) = 22.6   > Patient was given Cholecalciferol 50,000 units PO x 1 dose today   > On 4/13/2017, patient was started on Cholecalciferol 2,000 units PO once daily    > Continue Cholecalciferol 2,000 units PO once daily       MEDICATIONS ON DISCHARGE:    Current Discharge Medication List      START taking these medications    Details   labetalol (NORMODYNE) 100 mg tablet Take 1 Tab by mouth every twelve (12) hours. Indications: hypertension  Qty: 60 Tab, Refills: 0    Associated Diagnoses: Hypertensive heart disease without heart failure      aspirin (ASPIRIN) 325 mg tablet Take 1 Tab by mouth daily (with breakfast). Indications: acute thromboembolic stroke  Qty: 30 Tab, Refills: 0    Associated Diagnoses: Acute ischemic stroke ([address]      metFORMIN (GLUCOPHAGE) 500 mg tablet Take 1 Tab by mouth two (2) times daily (with meals).  Indications: type 2 diabetes mellitus  Qty: 60 Tab, Refills: 0    Associated Diagnoses: Type 2 diabetes mellitus with stage 2 chronic kidney disease, without long-term current use of insulin (HCC)      glipiZIDE (GLUCOTROL) 5 mg tablet Take 1 Tab by mouth Daily (before breakfast). Indications: type 2 diabetes mellitus  Qty: 30 Tab, Refills: 0    Associated Diagnoses: Type 2 diabetes mellitus with stage 2 chronic kidney disease, without long-term current use of insulin (HCC)      atorvastatin (LIPITOR) 40 mg tablet Take 1 Tab by mouth nightly. Indications: DYSLIPIDEMIA  Qty: 30 Tab, Refills: 0    Associated Diagnoses: Acute ischemic stroke ([facility] [address] Dyslipidemia with low high density lipoprotein (HDL) cholesterol with hypertriglyceridemia due to type 2 diabetes mellitus (HCC)      lisinopril (PRINIVIL, ZESTRIL) 40 mg tablet Take 1 Tab by mouth daily. Indications: hypertension  Qty: 30 Tab, Refills: 0    Associated Diagnoses: Hypertensive heart disease without heart failure      cholecalciferol (VITAMIN D3) 1,000 unit tablet Take 2 Tabs by mouth daily. Indications: VITAMIN D INSUFFICIENCY  Qty: 60 Tab, Refills: 0    Associated Diagnoses: Vitamin D insufficiency      omega-3 acid ethyl esters (LOVAZA) 1 gram capsule Take 1 Cap by mouth two (2) times daily (with meals). Indications: HYPERTRIGLYCERIDEMIA  Qty: 60 Cap, Refills: 0    Associated Diagnoses: Dyslipidemia with low high density lipoprotein (HDL) cholesterol with hypertriglyceridemia due to type 2 diabetes mellitus ([facility]         CONTINUE these medications which have CHANGED    Details   amLODIPine (NORVASC) 10 mg tablet Take 1 Tab by mouth daily.  Indications: hypertension  Qty: 30 Tab, Refills: 0    Associated Diagnoses: Hypertensive heart disease without heart failure         STOP taking these medications       lisinopril-hydroCHLOROthiazide (PRINZIDE, ZESTORETIC) 20-25 mg per tablet Comments:   Reason for Stopping:               DISCHARGE VITAL SIGNS:  Visit Vitals    BP (!) 137/92    Pulse 83    Temp 97.7 °F (36.5 °C)    Resp 19    Ht 5' 6\" (1.676 m)    Wt 91.1 kg (200 lb 13.4 oz)    SpO2 99%    BMI 32.42 kg/m2       DISCHARGE PHYSICAL EXAMINATION:  GENERAL SURVEY: Patient is awake, alert, oriented x 3, sitting comfortably on the chair, not in acute respiratory distress. HEENT: pink palpebral conjunctivae, anicteric sclerae, no nasoaural discharge, moist oral mucosa  NECK: supple, no jugular venous distention, no palpable lymph nodes  CHEST/LUNGS: symmetrical chest expansion, good air entry, clear breath sounds  HEART: adynamic precordium, good S1 S2, no S3, regular rhythm, no murmurs  ABDOMEN: flat, bowel sounds appreciated, soft, non-tender  EXTREMITIES: pink nailbeds, no edema, full and equal pulses, no calf tenderness   NEUROLOGICAL EXAM: The patient is awake, alert and oriented x3, able to answer questions fairly appropriately, able to follow 1 and 2 step commands. Able to tell time from the wall clock. Cranial nerves II-XII are grossly intact. No gross sensory deficit. Motor strength is 5/5 on the RUE and RLE, 4+/5 on the LUE and LLE. (+) mild cognitive-linguistic deficits. CONDITION ON DISCHARGE: Stable. DISPOSITION: Patient clinically improved and was discharged home with outpatient physical therapy, occupational therapy and speech therapy. FOLLOW-UP RECOMMENDATIONS:   Follow-up Information     Follow up With Details Comments Contact Info    Flash García MD On 5/1/2017 Patient has an appointment scheduled with PCP Dr. Charanjit Tierney on May 1, 2017 @ 9:45am. [facility] [address] Keenan Jeans, MD On 6/9/2017 Patient has an appointment scheduled with Neurology Dr. Dalia Maddox on June 9, 2017 @ 11:00am. [address]  [address] [facility]  [phone]            OTHER INSTRUCTIONS:  1. Diet. Regular-consistency, diabetic, low saturated fat diet with thin liquids. 2. Activity. As tolerated. 3. Safety / fall precautions. 4. Aspiration precautions. TIME SPENT ON DISCHARGE ACTIVITIES: More than 30 minutes.       Signed:  Sarbjit Jordan MD    4/18/2017

## 2017-05-31 NOTE — PROGRESS NOTES
Sw entered to chart to provide insurance auth from pt's ARU admission on 4/11/17. Account has been cleared for billing.
